# Patient Record
Sex: FEMALE | Race: WHITE | URBAN - METROPOLITAN AREA
[De-identification: names, ages, dates, MRNs, and addresses within clinical notes are randomized per-mention and may not be internally consistent; named-entity substitution may affect disease eponyms.]

---

## 2017-12-13 ENCOUNTER — HOSPITAL ENCOUNTER (EMERGENCY)
Facility: CLINIC | Age: 52
Discharge: HOME | End: 2017-12-14
Attending: NURSE PRACTITIONER | Admitting: NURSE PRACTITIONER
Payer: COMMERCIAL

## 2017-12-13 ENCOUNTER — APPOINTMENT (OUTPATIENT)
Dept: CT IMAGING | Facility: CLINIC | Age: 52
End: 2017-12-13
Attending: NURSE PRACTITIONER
Payer: COMMERCIAL

## 2017-12-13 DIAGNOSIS — R07.89 ATYPICAL CHEST PAIN: ICD-10-CM

## 2017-12-13 DIAGNOSIS — I10 ESSENTIAL HYPERTENSION: ICD-10-CM

## 2017-12-13 DIAGNOSIS — R06.02 SHORTNESS OF BREATH: ICD-10-CM

## 2017-12-13 LAB
ALBUMIN SERPL-MCNC: 3.8 G/DL (ref 3.4–5)
ALP SERPL-CCNC: 50 U/L (ref 40–150)
ALT SERPL W P-5'-P-CCNC: 49 U/L (ref 0–50)
ANION GAP SERPL CALCULATED.3IONS-SCNC: 8 MMOL/L (ref 3–14)
AST SERPL W P-5'-P-CCNC: 16 U/L (ref 0–45)
BASOPHILS # BLD AUTO: 0 10E9/L (ref 0–0.2)
BASOPHILS NFR BLD AUTO: 0.3 %
BILIRUB SERPL-MCNC: 0.3 MG/DL (ref 0.2–1.3)
BUN SERPL-MCNC: 10 MG/DL (ref 7–30)
CALCIUM SERPL-MCNC: 9 MG/DL (ref 8.5–10.1)
CHLORIDE SERPL-SCNC: 109 MMOL/L (ref 94–109)
CO2 SERPL-SCNC: 25 MMOL/L (ref 20–32)
CREAT SERPL-MCNC: 0.61 MG/DL (ref 0.52–1.04)
D DIMER PPP FEU-MCNC: 0.8 UG/ML FEU (ref 0–0.5)
DIFFERENTIAL METHOD BLD: NORMAL
EOSINOPHIL # BLD AUTO: 0.2 10E9/L (ref 0–0.7)
EOSINOPHIL NFR BLD AUTO: 2.8 %
ERYTHROCYTE [DISTWIDTH] IN BLOOD BY AUTOMATED COUNT: 14 % (ref 10–15)
GFR SERPL CREATININE-BSD FRML MDRD: >90 ML/MIN/1.7M2
GLUCOSE SERPL-MCNC: 169 MG/DL (ref 70–99)
HCT VFR BLD AUTO: 39.6 % (ref 35–47)
HGB BLD-MCNC: 13.3 G/DL (ref 11.7–15.7)
IMM GRANULOCYTES # BLD: 0 10E9/L (ref 0–0.4)
IMM GRANULOCYTES NFR BLD: 0.1 %
LIPASE SERPL-CCNC: 400 U/L (ref 73–393)
LYMPHOCYTES # BLD AUTO: 3.2 10E9/L (ref 0.8–5.3)
LYMPHOCYTES NFR BLD AUTO: 47.6 %
MCH RBC QN AUTO: 28.6 PG (ref 26.5–33)
MCHC RBC AUTO-ENTMCNC: 33.6 G/DL (ref 31.5–36.5)
MCV RBC AUTO: 85 FL (ref 78–100)
MONOCYTES # BLD AUTO: 0.6 10E9/L (ref 0–1.3)
MONOCYTES NFR BLD AUTO: 9.1 %
NEUTROPHILS # BLD AUTO: 2.7 10E9/L (ref 1.6–8.3)
NEUTROPHILS NFR BLD AUTO: 40.1 %
PLATELET # BLD AUTO: 183 10E9/L (ref 150–450)
POTASSIUM SERPL-SCNC: 3.8 MMOL/L (ref 3.4–5.3)
PROT SERPL-MCNC: 7.5 G/DL (ref 6.8–8.8)
RBC # BLD AUTO: 4.65 10E12/L (ref 3.8–5.2)
SODIUM SERPL-SCNC: 142 MMOL/L (ref 133–144)
TROPONIN I SERPL-MCNC: <0.015 UG/L (ref 0–0.04)
WBC # BLD AUTO: 6.7 10E9/L (ref 4–11)

## 2017-12-13 PROCEDURE — 85025 COMPLETE CBC W/AUTO DIFF WBC: CPT | Performed by: NURSE PRACTITIONER

## 2017-12-13 PROCEDURE — 25000132 ZZH RX MED GY IP 250 OP 250 PS 637: Performed by: NURSE PRACTITIONER

## 2017-12-13 PROCEDURE — 25000125 ZZHC RX 250: Performed by: NURSE PRACTITIONER

## 2017-12-13 PROCEDURE — 93010 ELECTROCARDIOGRAM REPORT: CPT | Mod: Z6 | Performed by: FAMILY MEDICINE

## 2017-12-13 PROCEDURE — 96374 THER/PROPH/DIAG INJ IV PUSH: CPT | Performed by: FAMILY MEDICINE

## 2017-12-13 PROCEDURE — 84484 ASSAY OF TROPONIN QUANT: CPT | Performed by: NURSE PRACTITIONER

## 2017-12-13 PROCEDURE — 83690 ASSAY OF LIPASE: CPT | Performed by: NURSE PRACTITIONER

## 2017-12-13 PROCEDURE — 85379 FIBRIN DEGRADATION QUANT: CPT | Performed by: NURSE PRACTITIONER

## 2017-12-13 PROCEDURE — 99284 EMERGENCY DEPT VISIT MOD MDM: CPT | Mod: 25 | Performed by: FAMILY MEDICINE

## 2017-12-13 PROCEDURE — 99285 EMERGENCY DEPT VISIT HI MDM: CPT | Mod: 25 | Performed by: FAMILY MEDICINE

## 2017-12-13 PROCEDURE — 96375 TX/PRO/DX INJ NEW DRUG ADDON: CPT | Mod: 59 | Performed by: FAMILY MEDICINE

## 2017-12-13 PROCEDURE — 71260 CT THORAX DX C+: CPT

## 2017-12-13 PROCEDURE — 25000128 H RX IP 250 OP 636: Performed by: NURSE PRACTITIONER

## 2017-12-13 PROCEDURE — 80053 COMPREHEN METABOLIC PANEL: CPT | Performed by: NURSE PRACTITIONER

## 2017-12-13 PROCEDURE — 93005 ELECTROCARDIOGRAM TRACING: CPT | Performed by: FAMILY MEDICINE

## 2017-12-13 RX ORDER — ALUMINA, MAGNESIA, AND SIMETHICONE 2400; 2400; 240 MG/30ML; MG/30ML; MG/30ML
15 SUSPENSION ORAL ONCE
Status: COMPLETED | OUTPATIENT
Start: 2017-12-13 | End: 2017-12-13

## 2017-12-13 RX ORDER — NITROGLYCERIN 0.4 MG/1
0.4 TABLET SUBLINGUAL EVERY 5 MIN PRN
Status: DISCONTINUED | OUTPATIENT
Start: 2017-12-13 | End: 2017-12-14 | Stop reason: HOSPADM

## 2017-12-13 RX ORDER — MORPHINE SULFATE 2 MG/ML
2-4 INJECTION, SOLUTION INTRAMUSCULAR; INTRAVENOUS EVERY 10 MIN PRN
Status: DISCONTINUED | OUTPATIENT
Start: 2017-12-13 | End: 2017-12-14 | Stop reason: HOSPADM

## 2017-12-13 RX ORDER — ASPIRIN 81 MG/1
324 TABLET, CHEWABLE ORAL ONCE
Status: COMPLETED | OUTPATIENT
Start: 2017-12-13 | End: 2017-12-13

## 2017-12-13 RX ORDER — ONDANSETRON 2 MG/ML
4 INJECTION INTRAMUSCULAR; INTRAVENOUS EVERY 30 MIN PRN
Status: DISCONTINUED | OUTPATIENT
Start: 2017-12-13 | End: 2017-12-14 | Stop reason: HOSPADM

## 2017-12-13 RX ORDER — IOPAMIDOL 755 MG/ML
100 INJECTION, SOLUTION INTRAVASCULAR ONCE
Status: COMPLETED | OUTPATIENT
Start: 2017-12-13 | End: 2017-12-13

## 2017-12-13 RX ADMIN — ASPIRIN 81 MG 324 MG: 81 TABLET ORAL at 22:26

## 2017-12-13 RX ADMIN — LIDOCAINE HYDROCHLORIDE 15 ML: 20 SOLUTION ORAL; TOPICAL at 22:53

## 2017-12-13 RX ADMIN — MORPHINE SULFATE 2 MG: 2 INJECTION, SOLUTION INTRAMUSCULAR; INTRAVENOUS at 22:53

## 2017-12-13 RX ADMIN — IOPAMIDOL 100 ML: 755 INJECTION, SOLUTION INTRAVENOUS at 23:11

## 2017-12-13 RX ADMIN — NITROGLYCERIN 0.4 MG: 0.4 TABLET SUBLINGUAL at 22:45

## 2017-12-13 RX ADMIN — NITROGLYCERIN 0.4 MG: 0.4 TABLET SUBLINGUAL at 22:27

## 2017-12-13 RX ADMIN — ALUMINUM HYDROXIDE, MAGNESIUM HYDROXIDE, AND DIMETHICONE 30 ML: 400; 400; 40 SUSPENSION ORAL at 22:52

## 2017-12-13 RX ADMIN — NITROGLYCERIN 0.4 MG: 0.4 TABLET SUBLINGUAL at 22:35

## 2017-12-13 RX ADMIN — ONDANSETRON 4 MG: 2 INJECTION INTRAMUSCULAR; INTRAVENOUS at 22:56

## 2017-12-13 RX ADMIN — SODIUM CHLORIDE 80 ML: 9 INJECTION, SOLUTION INTRAVENOUS at 23:11

## 2017-12-13 ASSESSMENT — ENCOUNTER SYMPTOMS
PALPITATIONS: 0
SHORTNESS OF BREATH: 1
BACK PAIN: 1
ACTIVITY CHANGE: 1

## 2017-12-13 NOTE — ED AVS SNAPSHOT
[facility] Emergency Department    [address] [place] [address]    Phone:  [phone]    Fax:  [phone]                                       Minnie Chisholm   MRN: [id number]    Department:  [facility] Emergency Department   Date of Visit:  12/13/2017           After Visit Summary Signature Page     I have received my discharge instructions, and my questions have been answered. I have discussed any challenges I see with this plan with the nurse or doctor.    ..........................................................................................................................................  Patient/Patient Representative Signature      ..........................................................................................................................................  Patient Representative Print Name and Relationship to Patient    ..................................................               ................................................  Date                                            Time    ..........................................................................................................................................  Reviewed by Signature/Title    ...................................................              ..............................................  Date                                                            Time

## 2017-12-13 NOTE — ED AVS SNAPSHOT
[facility] Emergency Department    [address] [place] [address]    Phone:  [phone]    Fax:  [phone]                                       Minnie Chisholm   MRN: [id number]    Department:  [facility] Emergency Department   Date of Visit:  12/13/2017           Patient Information     Date Of Birth          1965        Your diagnoses for this visit were:     Atypical chest pain     Shortness of breath     Essential hypertension (uncontrolled)        You were seen by Trisha Nguyen APRN CNP and Ricco Richardson MD.      Follow-up Information     Follow up with Deandre Camejo MD In 5 days.    Specialty:  Family Practice    Contact information:    [address] [facility] [address]  [phone]          Follow up with [facility] Emergency Department.    Specialty:  EMERGENCY MEDICINE    Why:  If symptoms worsen    Contact information:    [address] [place] [id number] [phone]    Additional information:    From [address]: Exit at [address] on south side of [place]. Turn right on [address]. Turn left at stoplight on [address]. [facility] will be in view two blocks ahead        Discharge Instructions       Thank you for giving us the opportunity to see you.  There was no evidence of a heart attack tonight based on the blood tests that we ran today.    Your CT scan was reassuring in that it did not show pulmonary embolism or arctic aneurysm, but did show evidence of emphysema from your previous smoking.    Your blood pressure was too high and not well controlled.    Please take your blood pressure medications consistently, and follow-up with Dr. Camejo within the next 5 days.  You need to have your blood pressure better controlled.    Discuss scheduling an outpatient cardiac stress test.    After discharge, please closely monitor for any new or worsening symptoms. Return to the Emergency Department at any time if  your symptoms worsen.        24 Hour Appointment Hotline       To make an appointment at any [facility], call [phone] ([phone]). If you don't have a family doctor or clinic, we will help you find one. [facility] are conveniently located to serve the needs of you and your family.             Review of your medicines      Our records show that you are taking the medicines listed below. If these are incorrect, please call your family doctor or clinic.        Dose / Directions Last dose taken    ASPIRIN PO   Dose:  81 mg        Take 81 mg by mouth   Refills:  0        lisinopril 20 MG tablet   Commonly known as:  PRINIVIL/ZESTRIL   Dose:  20 mg   Quantity:  90 tablet        Take 1 tablet (20 mg) by mouth daily   Refills:  3        metoprolol 50 MG tablet   Commonly known as:  LOPRESSOR   Dose:  50 mg   Quantity:  180 tablet        Take 1 tablet (50 mg) by mouth 2 times daily   Refills:  3        nicotine polacrilex 4 MG gum   Commonly known as:  NICORETTE        1 PIECE OF GUM UP TO 10 TIMES PER DAY AS NEEDED   Refills:  0        triamcinolone 0.1 % cream   Commonly known as:  KENALOG   Quantity:  30 g        Apply sparingly to ear canal two times daily.   Refills:  0                Procedures and tests performed during your visit     Procedure/Test Number of Times Performed    CBC with platelets differential 1    CT Chest Pulmonary Embolism w Contrast 1    Cardiac Continuous Monitoring 1    Comprehensive metabolic panel 1    D dimer quantitative 1    EKG 12-lead, tracing only 2    Lipase 1    Peripheral IV: Standard 1    Pulse oximetry nursing 1    Troponin I 1    Troponin I (second draw) 1      Orders Needing Specimen Collection     None      Pending Results     No orders found for last 3 day(s).            Pending Culture Results     No orders found for last 3 day(s).            Pending Results Instructions     If you had any lab results that were not finalized at the time of your Discharge,  you can call the ED Lab Result RN at [phone]. You will be contacted by this team for any positive Lab results or changes in treatment. The nurses are available 7 days a week from 10A to 6:30P.  You can leave a message 24 hours per day and they will return your call.        Thank you for choosing [facility]       Thank you for choosing [facility] for your care. Our goal is always to provide you with excellent care. Hearing back from our patients is one way we can continue to improve our services. Please take a few minutes to complete the written survey that you may receive in the mail after you visit with us. Thank you!        [employer] Information     [employer] gives you secure access to your electronic health record. If you see a primary care provider, you can also send messages to your care team and make appointments. If you have questions, please call your primary care clinic.  If you do not have a primary care provider, please call [phone] and they will assist you.        Care EveryWhere ID     This is your Care EveryWhere ID. This could be used by other organizations to access your [facility] medical records  [id number]        Equal Access to Services     JC HAYES : Hadii mckenna Geronimo, juliet paulson, shivam ding, graciela biggs. So [facility] [phone].    ATENCIÓN: Si habla español, tiene a vazquez disposición servicios gratuitos de asistencia lingüística. Llame al [phone].    We comply with applicable federal civil rights laws and [place] laws. We do not discriminate on the basis of race, color, national origin, age, disability, sex, sexual orientation, or gender identity.            After Visit Summary       This is your record. Keep this with you and show to your community pharmacist(s) and doctor(s) at your next visit.

## 2017-12-14 VITALS
WEIGHT: 245 LBS | TEMPERATURE: 99.5 F | HEART RATE: 72 BPM | OXYGEN SATURATION: 99 % | BODY MASS INDEX: 35.15 KG/M2 | RESPIRATION RATE: 13 BRPM | DIASTOLIC BLOOD PRESSURE: 76 MMHG | SYSTOLIC BLOOD PRESSURE: 109 MMHG

## 2017-12-14 LAB — TROPONIN I SERPL-MCNC: 0.02 UG/L (ref 0–0.04)

## 2017-12-14 PROCEDURE — 96376 TX/PRO/DX INJ SAME DRUG ADON: CPT | Performed by: FAMILY MEDICINE

## 2017-12-14 PROCEDURE — 25000128 H RX IP 250 OP 636: Performed by: NURSE PRACTITIONER

## 2017-12-14 PROCEDURE — 84484 ASSAY OF TROPONIN QUANT: CPT | Performed by: FAMILY MEDICINE

## 2017-12-14 PROCEDURE — 36415 COLL VENOUS BLD VENIPUNCTURE: CPT | Performed by: FAMILY MEDICINE

## 2017-12-14 RX ADMIN — MORPHINE SULFATE 2 MG: 2 INJECTION, SOLUTION INTRAMUSCULAR; INTRAVENOUS at 00:21

## 2017-12-14 NOTE — ED PROVIDER NOTES
History     Chief Complaint   Patient presents with     Chest Pain     HPI  Minnie Chisholm is a 52 year old female who presents to the emergency department today with complaints of left-sided chest pain.  Patient reports associated indigestion/heartburn and shortness of breath.  Patient reports that the pain started in her left side/back region at 11:00 this morning, chest pain started this evening around 2100 and became worse tonight when she was walking up the stairs.  Patient denies any injury.  Patient denies any history of cardiac disease.  Patient does have a history of high blood pressure and takes lisinopril and metoprolol for this.  She denies any history of high cholesterol.  Patient does report that her dad does have a cardiac history, she is not a smoker.  She is not diabetic.  Patient denies any known history of blood clots.  Patient denies any current headache, she arrives here quite hypertensive with a pressure of 210/126.    Problem List:    Patient Active Problem List    Diagnosis Date Noted     Hypertension goal BP (blood pressure) < 140/90 02/15/2011     Priority: Medium     Anxiety state      Priority: Medium     Problem list name updated by automated process. Provider to review       HYPERLIPIDEMIA LDL GOAL <160 10/31/2010     Priority: Medium     Disturbance of skin sensation      Priority: Medium     Esophageal reflux      Priority: Medium     Mammary duct ectasia      Priority: Medium        Past Medical History:    Past Medical History:   Diagnosis Date     Chest pain, unspecified 06/30/2005     Disturbance of skin sensation      Esophageal reflux      Female stress incontinence      Hyperlipidemia LDL goal <160 10/31/2010     Hypertension goal BP (blood pressure) < 140/90 2/15/2011     Irregular menstrual cycle      Mammary duct ectasia      Tobacco use disorder        Past Surgical History:    Past Surgical History:   Procedure Laterality Date     ANTROSTOMY NASAL  5/22/2012     Procedure:ANTROSTOMY NASAL; Endoscopic Right maxillary sinus surgery exploration for right antrostomy and right submucousal resection of the turbinates; Surgeon:ALEXA JHAVERI; Location:[facility] OR     BREAST LUMPECTOMY, RT/LT  1/30/08    right breast lump removed     C LIGATE FALLOPIAN TUBE,POSTPARTUM      Tubal Ligation     C TOTAL ABD HYSTERECTOMY+BLAD REPR  6/16/2008     C TREAT ECTOPIC PREG,NON REMVAL      Tube & Ectopic Preg., Removal     CHOLECYSTECTOMY, LAPOROSCOPIC  10/20/06    Cholecystectomy, Laparoscopic     COLONOSCOPY  12/8/2010    COMBINED COLONOSCOPY, SINGLE BIOPSY/POLYPECTOMY BY BIOPSY performed by CATIE VICENTE at  GI     ENDOSCOPIC SINUS SURGERY  5/22/2012    Procedure:ENDOSCOPIC SINUS SURGERY; Surgeon:ALEXA JHAVERI; Location: OR     ENDOSCOPY  07/17/2006    Upper GI     HC EXCISION BREAST LESION W XRAY MARKER, OPEN SINGLE  01/29/08    right     HC PLACE NEEDLE WIRE PREOP, BREAST, INITIAL  01/29/08     HEMILAMINECTOMY, DISCECTOMY LUMBAR ONE LEVEL, COMBINED Left 4/1/2015    Procedure: COMBINED HEMILAMINECTOMY, DISCECTOMY LUMBAR ONE LEVEL;  Surgeon: Emilio Patten MD;  Location: [facility] OR     HYSTERECTOMY, PAP NO LONGER INDICATED       SURGICAL HISTORY OF -       mammary gland removal, left     TURBINOPLASTY  5/22/2012    Procedure:TURBINOPLASTY; Surgeon:ALEXA JHAVERI; Location: OR       Family History:    Family History   Problem Relation Age of Onset     CEREBROVASCULAR DISEASE Mother      [email or web address].     DIABETES Maternal Grandfather      HEART DISEASE Maternal Grandfather      stephanie. Heart Failure     Alzheimer Disease Maternal Grandmother      Hypertension Brother      Lipids Brother      Hypertension Brother      Lipids Brother      Congenital Anomalies Brother      Cancer - colorectal Maternal Uncle      possible colitis     Neurologic Disorder Father      ALS     Anesthesia Reaction Mother      Respiratory arrest with anesthesia       Social History:  Marital Status:    [5]  Social History   Substance Use Topics     Smoking status: Former Smoker     Packs/day: 0.00     Years: 15.00     Types: Cigarettes     Quit date: 9/5/2011     Smokeless tobacco: Never Used      Comment: Is now just chewing nicotine gum, 10 pc. per day     Alcohol use 3.0 oz/week      Comment: rare        Medications:      ASPIRIN PO   metoprolol (LOPRESSOR) 50 MG tablet   lisinopril (PRINIVIL,ZESTRIL) 20 MG tablet   triamcinolone (KENALOG) 0.1 % cream   NICOTINE POLACRILEX 4 MG MT GUM         Review of Systems   Constitutional: Positive for activity change.   Respiratory: Positive for shortness of breath.    Cardiovascular: Positive for chest pain. Negative for palpitations and leg swelling.   Musculoskeletal: Positive for back pain.   All other systems reviewed and are negative.      Physical Exam   BP: (!) 210/126  Pulse: 87  Heart Rate: 80  Temp: 99.5  F (37.5  C)  Resp: 18  Weight: 111.1 kg (245 lb)  SpO2: 97 %      Physical Exam   Constitutional: She is oriented to person, place, and time. She appears well-developed and well-nourished. No distress.   HENT:   Mouth/Throat: Oropharynx is clear and moist.   Eyes: Conjunctivae are normal.   Neck: Normal range of motion. Neck supple.   Cardiovascular: Normal rate, regular rhythm, normal heart sounds and intact distal pulses.    No murmur heard.  Pulmonary/Chest: Effort normal and breath sounds normal. She exhibits no tenderness.   Abdominal: Soft. Bowel sounds are normal. She exhibits no distension. There is no tenderness.   Musculoskeletal: Normal range of motion.   Neurological: She is alert and oriented to person, place, and time.   Skin: Skin is warm and dry. She is not diaphoretic.   Psychiatric: She has a normal mood and affect.       ED Course     ED Course     Procedures         EKG Interpretation:      Interpreted by Trisha Nguyen  Time reviewed: 2213  Symptoms at time of EKG: Chest pain, left sided pain  Rhythm: normal sinus   Rate:  normal  Axis: normal  Ectopy: none  Conduction: normal  ST Segments/ T Waves: No ST-T wave changes  Q Waves: none  Comparison to prior: Unchanged  Clinical Impression: normal EKG      Results for orders placed or performed during the hospital encounter of 12/13/17   CT Chest Pulmonary Embolism w Contrast    Narrative    CT CHEST PULMONARY EMBOLISM W CONTRAST  12/13/2017 11:21 PM     HISTORY: Elevated D-dimer, chest pain.     TECHNIQUE: Volumetric acquisition of the chest after the  administration of 90 mL Isovue-370 IV contrast. Radiation dose for  this scan was reduced using automated exposure control, adjustment of  the mA and/or kV according to patient size, or iterative  reconstruction technique.     COMPARISON: None.    FINDINGS: No visualized pulmonary embolism. Normal heart size. No  thoracic aortic aneurysm. No acute infiltrates, pleural effusions, or  pneumothorax. Areas of minimal linear atelectasis or scarring in both  lungs. Emphysema. Cholecystectomy. No destructive bone lesions.      Impression    IMPRESSION:  1. No visualized pulmonary embolism or other acute findings.  2. Emphysema.   CBC with platelets differential   Result Value Ref Range    WBC 6.7 4.0 - 11.0 10e9/L    RBC Count 4.65 3.8 - 5.2 10e12/L    Hemoglobin 13.3 11.7 - 15.7 g/dL    Hematocrit 39.6 35.0 - 47.0 %    MCV 85 78 - 100 fl    MCH 28.6 26.5 - 33.0 pg    MCHC 33.6 31.5 - 36.5 g/dL    RDW 14.0 10.0 - 15.0 %    Platelet Count 183 150 - 450 10e9/L    Diff Method Automated Method     % Neutrophils 40.1 %    % Lymphocytes 47.6 %    % Monocytes 9.1 %    % Eosinophils 2.8 %    % Basophils 0.3 %    % Immature Granulocytes 0.1 %    Absolute Neutrophil 2.7 1.6 - 8.3 10e9/L    Absolute Lymphocytes 3.2 0.8 - 5.3 10e9/L    Absolute Monocytes 0.6 0.0 - 1.3 10e9/L    Absolute Eosinophils 0.2 0.0 - 0.7 10e9/L    Absolute Basophils 0.0 0.0 - 0.2 10e9/L    Abs Immature Granulocytes 0.0 0 - 0.4 10e9/L   Troponin I   Result Value Ref Range    Troponin  I ES <0.015 0.000 - 0.045 ug/L   Comprehensive metabolic panel   Result Value Ref Range    Sodium 142 133 - 144 mmol/L    Potassium 3.8 3.4 - 5.3 mmol/L    Chloride 109 94 - 109 mmol/L    Carbon Dioxide 25 20 - 32 mmol/L    Anion Gap 8 3 - 14 mmol/L    Glucose 169 (H) 70 - 99 mg/dL    Urea Nitrogen 10 7 - 30 mg/dL    Creatinine 0.61 0.52 - 1.04 mg/dL    GFR Estimate >90 >60 mL/min/1.7m2    GFR Estimate If Black >90 >60 mL/min/1.7m2    Calcium 9.0 8.5 - 10.1 mg/dL    Bilirubin Total 0.3 0.2 - 1.3 mg/dL    Albumin 3.8 3.4 - 5.0 g/dL    Protein Total 7.5 6.8 - 8.8 g/dL    Alkaline Phosphatase 50 40 - 150 U/L    ALT 49 0 - 50 U/L    AST 16 0 - 45 U/L   D dimer quantitative   Result Value Ref Range    D Dimer 0.8 (H) 0.0 - 0.50 ug/ml FEU   Lipase   Result Value Ref Range    Lipase 400 (H) 73 - 393 U/L       Assessments & Plan (with Medical Decision Making)  Minnie is a 52-year-old female with a history of hypertension and acid reflux who presents to the emergency department today with chest pain, left-sided pain and shortness of breath.  Please refer to HPI and focused exam.  Patient also endorses nausea on arrival.  Patient is quite hypertensive on arrival here with an initial pressure of 210/126, concerns for an acute cardiac event given patient's history and symptoms.  EKG was obtained on arrival and is negative for any acute ischemic findings.  Patient was given nitroglycerin which did resolve her chest pain, it did not improve her left-sided pain.  Patient was given a full dose of aspirin as well.  Patient was given a GI cocktail for acid reflux as well as morphine for her left-sided pain and is currently at 2345 pain-free.  Blood work was obtained including a CBC, troponin, CMP, d-dimer, lipase.  CBC is unremarkable.  CMP returned with a mildly elevated glucose of 169 is otherwise within normal limits.  D-dimer is elevated at 0.8, lipase is mildly elevated at 400.  Initial troponin is undetectable.  CT PE was  obtained secondary to elevated d-dimer and is fortunately negative for PE.  Patient has a current heart score of 3, given her relief of chest pain with nitroglycerin I am going to obtain a 4 hour troponin.  If this remains undetectable I feel patient is stable to be discharged home.  This will be followed up by Dr. Richardson.  Patient was updated on plan of care and is agreeable.  Remains in stable condition, her blood pressure has improved and is currently 159/93.     I have reviewed the nursing notes.    I have reviewed the findings, diagnosis, plan and need for follow up with the patient.    New Prescriptions    No medications on file       Final diagnoses:   Atypical chest pain   Shortness of breath       12/13/2017   [facility] EMERGENCY DEPARTMENT     Trisha Nguyen, MIK CNP  12/13/17 5613

## 2017-12-14 NOTE — ED NOTES
Pt c/o chest pain, SOB and indigestion since 1100.  Worse tonight with walking up the stairs and activity.

## 2017-12-14 NOTE — DISCHARGE INSTRUCTIONS
Thank you for giving us the opportunity to see you.  There was no evidence of a heart attack tonight based on the blood tests that we ran today.    Your CT scan was reassuring in that it did not show pulmonary embolism or arctic aneurysm, but did show evidence of emphysema from your previous smoking.    Your blood pressure was too high and not well controlled.    Please take your blood pressure medications consistently, and follow-up with Dr. Camejo within the next 5 days.  You need to have your blood pressure better controlled.    Discuss scheduling an outpatient cardiac stress test.    After discharge, please closely monitor for any new or worsening symptoms. Return to the Emergency Department at any time if your symptoms worsen.

## 2017-12-14 NOTE — ED NOTES
DMeyer NP updated on pain and BP after 1st nitro. To give up to two more. Then go to GI cocktail and morphine

## 2017-12-14 NOTE — ED NOTES
MERLENE Nguyen NP wants pt to get asa, then one nitro. If no relief can give GI cocktail and morphine

## 2017-12-14 NOTE — ED NOTES
"Pt states minimal pain to left mid back. Pt states \"much better than earlier.\" Pt's /76. Pt denies chest pain or shortness of breath. Pt feels comfortable with plan.   "

## 2017-12-15 DIAGNOSIS — I10 ESSENTIAL HYPERTENSION WITH GOAL BLOOD PRESSURE LESS THAN 140/90: ICD-10-CM

## 2017-12-15 RX ORDER — METOPROLOL TARTRATE 50 MG
TABLET ORAL
Qty: 60 TABLET | Refills: 0 | Status: SHIPPED | OUTPATIENT
Start: 2017-12-15 | End: 2017-12-18

## 2017-12-15 NOTE — TELEPHONE ENCOUNTER
Routing refill request to provider for review/approval because:  A break in medication, RX  >5 months ago  Patient has not been seen in >1 year    Jessie Ruelas, RN  [facility]

## 2017-12-15 NOTE — TELEPHONE ENCOUNTER
Requested Prescriptions   Pending Prescriptions Disp Refills     metoprolol (LOPRESSOR) 50 MG tablet [Pharmacy Med Name: METOPROLOL TARTRATE 50MG TABS] 180 tablet 3     Sig: TAKE ONE TABLET BY MOUTH TWICE A DAY    Beta-Blockers Protocol Passed    12/15/2017 10:05 AM       Passed - Blood pressure under 140/90    BP Readings from Last 3 Encounters:   12/14/17 109/76   05/16/16 136/84   03/28/16 (!) 195/115                Passed - Patient is age 6 or older       Passed - Recent or future visit with authorizing provider's specialty    Patient had office visit in the last year or has a visit in the next 30 days with authorizing provider.  See chart review.

## 2017-12-18 ENCOUNTER — OFFICE VISIT (OUTPATIENT)
Dept: FAMILY MEDICINE | Facility: OTHER | Age: 52
End: 2017-12-18
Payer: COMMERCIAL

## 2017-12-18 VITALS
BODY MASS INDEX: 35.3 KG/M2 | HEART RATE: 96 BPM | SYSTOLIC BLOOD PRESSURE: 132 MMHG | TEMPERATURE: 98.2 F | WEIGHT: 246.6 LBS | OXYGEN SATURATION: 97 % | DIASTOLIC BLOOD PRESSURE: 84 MMHG | HEIGHT: 70 IN | RESPIRATION RATE: 16 BRPM

## 2017-12-18 DIAGNOSIS — Z11.59 ENCOUNTER FOR HEPATITIS C SCREENING TEST FOR LOW RISK PATIENT: ICD-10-CM

## 2017-12-18 DIAGNOSIS — K21.9 GASTROESOPHAGEAL REFLUX DISEASE WITHOUT ESOPHAGITIS: ICD-10-CM

## 2017-12-18 DIAGNOSIS — E66.01 MORBID OBESITY (H): ICD-10-CM

## 2017-12-18 DIAGNOSIS — L21.9 SEBORRHEIC DERMATITIS: ICD-10-CM

## 2017-12-18 DIAGNOSIS — I10 ESSENTIAL HYPERTENSION WITH GOAL BLOOD PRESSURE LESS THAN 140/90: ICD-10-CM

## 2017-12-18 DIAGNOSIS — R07.89 ATYPICAL CHEST PAIN: ICD-10-CM

## 2017-12-18 DIAGNOSIS — E78.5 HYPERLIPIDEMIA LDL GOAL <160: ICD-10-CM

## 2017-12-18 DIAGNOSIS — Z12.31 ENCOUNTER FOR SCREENING MAMMOGRAM FOR BREAST CANCER: Primary | ICD-10-CM

## 2017-12-18 LAB
ANION GAP SERPL CALCULATED.3IONS-SCNC: 8 MMOL/L (ref 3–14)
BUN SERPL-MCNC: 9 MG/DL (ref 7–30)
CALCIUM SERPL-MCNC: 9 MG/DL (ref 8.5–10.1)
CHLORIDE SERPL-SCNC: 107 MMOL/L (ref 94–109)
CHOLEST SERPL-MCNC: 218 MG/DL
CO2 SERPL-SCNC: 27 MMOL/L (ref 20–32)
CREAT SERPL-MCNC: 0.77 MG/DL (ref 0.52–1.04)
GFR SERPL CREATININE-BSD FRML MDRD: 78 ML/MIN/1.7M2
GLUCOSE SERPL-MCNC: 125 MG/DL (ref 70–99)
HCV AB SERPL QL IA: NONREACTIVE
HDLC SERPL-MCNC: 40 MG/DL
LDLC SERPL CALC-MCNC: 146 MG/DL
NONHDLC SERPL-MCNC: 178 MG/DL
POTASSIUM SERPL-SCNC: 4 MMOL/L (ref 3.4–5.3)
SODIUM SERPL-SCNC: 142 MMOL/L (ref 133–144)
TRIGL SERPL-MCNC: 161 MG/DL

## 2017-12-18 PROCEDURE — 80061 LIPID PANEL: CPT | Performed by: FAMILY MEDICINE

## 2017-12-18 PROCEDURE — 36415 COLL VENOUS BLD VENIPUNCTURE: CPT | Performed by: FAMILY MEDICINE

## 2017-12-18 PROCEDURE — 99214 OFFICE O/P EST MOD 30 MIN: CPT | Performed by: FAMILY MEDICINE

## 2017-12-18 PROCEDURE — 80048 BASIC METABOLIC PNL TOTAL CA: CPT | Performed by: FAMILY MEDICINE

## 2017-12-18 PROCEDURE — 86803 HEPATITIS C AB TEST: CPT | Performed by: FAMILY MEDICINE

## 2017-12-18 RX ORDER — METOPROLOL TARTRATE 50 MG
50 TABLET ORAL 2 TIMES DAILY
Qty: 180 TABLET | Refills: 3 | Status: SHIPPED | OUTPATIENT
Start: 2017-12-18 | End: 2018-12-20

## 2017-12-18 RX ORDER — LISINOPRIL 20 MG/1
20 TABLET ORAL DAILY
Qty: 90 TABLET | Refills: 3 | Status: SHIPPED | OUTPATIENT
Start: 2017-12-18 | End: 2018-12-20

## 2017-12-18 RX ORDER — TRIAMCINOLONE ACETONIDE 1 MG/G
CREAM TOPICAL
Qty: 30 G | Refills: 0 | Status: SHIPPED | OUTPATIENT
Start: 2017-12-18 | End: 2018-03-30

## 2017-12-18 ASSESSMENT — PAIN SCALES - GENERAL: PAINLEVEL: MILD PAIN (2)

## 2017-12-18 NOTE — NURSING NOTE
"Chief Complaint   Patient presents with     RECHECK     Hypertension       Initial /84 (BP Location: Right arm, Patient Position: Chair, Cuff Size: Adult Large)  Pulse 96  Temp 98.2  F (36.8  C) (Temporal)  Resp 16  Ht 5' 10\" (1.778 m)  Wt 246 lb 9.6 oz (111.9 kg)  LMP 01/15/2008  SpO2 97%  BMI 35.38 kg/m2 Estimated body mass index is 35.38 kg/(m^2) as calculated from the following:    Height as of this encounter: 5' 10\" (1.778 m).    Weight as of this encounter: 246 lb 9.6 oz (111.9 kg).  Medication Reconciliation: complete     Lisa Carmona MA 12/18/2017  9:34 AM          "

## 2017-12-18 NOTE — MR AVS SNAPSHOT
After Visit Summary   12/18/2017    Minnie Chisholm    MRN: [id number]           Patient Information     Date Of Birth          1965        Visit Information        Provider Department      12/18/2017 9:20 AM Deandre Camejo MD [facility]        Today's Diagnoses     Encounter for screening mammogram for breast cancer    -  1    Essential hypertension with goal blood pressure less than 140/90        Seborrheic dermatitis        Encounter for hepatitis C screening test for low risk patient        Hyperlipidemia LDL goal <160        Atypical chest pain        Gastroesophageal reflux disease without esophagitis        Morbid obesity (H)           Follow-ups after your visit        Follow-up notes from your care team     Return in about 4 weeks (around 1/15/2018) for BP Recheck recheck breathing.      Your next 10 appointments already scheduled     Dec 19, 2017  2:00 PM CST   (Arrive by 1:45 PM)   MA SCREENING DIGITAL BILATERAL with MCMA1   [facility] ([facility]    [address] [facility] [id number] [phone]           Do not use any powder, lotion or deodorant under your arms or on your breast. If you do, we will ask you to remove it before your exam.  Wear comfortable, two-piece clothing.  If you have any allergies, tell your care team.  Bring any previous mammograms from other facilities or have them mailed to the breast center.            Dec 20, 2017  2:00 PM CST   Ech Stress Test with PHECHR2   [facility] ([facility])    [address] [place] [address]   [phone]           1. Please bring or wear a comfortable two-piece outfit and walking shoes. 2. Stop eating 3 hours before the test. You may drink water or juice. 3. Stop all caffeine 12 hours before the test. This includes coffee, tea, soda pop, chocolate and certain medicines (such as Anacin and Excederin). Also avoid decaf coffee and  tea, as these contain small amounts of caffeine. 4. No alcohol, smoking or use of other tobacco products for 12 hours before the test. 5. Refer to your provider instructions to see if you need to stop any medications (such as beta-blockers or nitrates) for this test. 6. For patients with diabetes: - If you take insulin, call your diabetes care team. Ask if you should take a   dose the morning of your test. - If you take diabetes medicine by mouth, dont take it on the morning of your test. Bring it with you to take after the test. (If you have questions, call your diabetes care team) 7. When you arrive, please tell us if: - You have diabetes. - You have taken Viagra, Cialis or Levitra in the past 48 hours. 8. For any questions that cannot be answered, please contact the ordering physician            Rommel 15, 2018  9:40 AM CST   Office Visit with Deandre Camejo MD   [facility] ([facility]    [facility] [address] [facility] [address] [phone]           Bring a current list of meds and any records pertaining to this visit. For Physicals, please bring immunization records and any forms needing to be filled out. Please arrive 10 minutes early to complete paperwork.              Future tests that were ordered for you today     Open Future Orders        Priority Expected Expires Ordered    Exercise Stress Echocardiogram Routine  12/18/2018 12/18/2017    MA Screening Digital Bilateral Routine  12/18/2018 12/18/2017            Who to contact     If you have questions or need follow up information about today's clinic visit or your schedule please contact [facility] directly at [phone].  Normal or non-critical lab and imaging results will be communicated to you by MyChart, letter or phone within 4 business days after the clinic has received the results. If you do not hear from us within 7 days, please contact the clinic through MyChart or phone. If you have a critical or  "abnormal lab result, we will notify you by phone as soon as possible.  Submit refill requests through [email or web address] or call your pharmacy and they will forward the refill request to us. Please allow 3 business days for your refill to be completed.          Additional Information About Your Visit        [employer] Information     [email or web address] gives you secure access to your electronic health record. If you see a primary care provider, you can also send messages to your care team and make appointments. If you have questions, please call your primary care clinic.  If you do not have a primary care provider, please call [phone] and they will assist you.        Care EveryWhere ID     This is your Care EveryWhere ID. This could be used by other organizations to access your [place] medical records  [id number]        Your Vitals Were     Pulse Temperature Respirations Height Last Period Pulse Oximetry    96 98.2  F (36.8  C) (Temporal) 16 5' 10\" (1.778 m) 01/15/2008 97%    BMI (Body Mass Index)                   35.38 kg/m2            Blood Pressure from Last 3 Encounters:   12/18/17 132/84   12/14/17 109/76   05/16/16 136/84    Weight from Last 3 Encounters:   12/18/17 246 lb 9.6 oz (111.9 kg)   12/13/17 245 lb (111.1 kg)   05/16/16 237 lb 6.4 oz (107.7 kg)              We Performed the Following     Basic metabolic panel     Hepatitis C antibody     Lipid Profile (Chol, Trig, HDL, LDL calc)          Today's Medication Changes          These changes are accurate as of: 12/18/17 10:14 AM.  If you have any questions, ask your nurse or doctor.               Start taking these medicines.        Dose/Directions    omeprazole 20 MG CR capsule   Commonly known as:  priLOSEC   Used for:  Gastroesophageal reflux disease without esophagitis   Started by:  Deandre Camejo MD        Dose:  20 mg   Take 1 capsule (20 mg) by mouth daily   Quantity:  30 capsule   Refills:  1         These medicines have changed or have updated prescriptions.  "       Dose/Directions    metoprolol 50 MG tablet   Commonly known as:  LOPRESSOR   This may have changed:  See the new instructions.   Used for:  Essential hypertension with goal blood pressure less than 140/90   Changed by:  Deandre Camejo MD        Dose:  50 mg   Take 1 tablet (50 mg) by mouth 2 times daily   Quantity:  180 tablet   Refills:  3            Where to get your medicines      These medications were sent to [facility] - [place], [place] - [address]   [address] , [facility] [address]     Phone:  [phone]     lisinopril 20 MG tablet    metoprolol 50 MG tablet    omeprazole 20 MG CR capsule    triamcinolone 0.1 % cream                Primary Care Provider Office Phone # Fax #    Deandre Camejo [phone]       [address] [facility] [address]        Equal Access to Services     JC HAYES : Hadii aad ku hadasho Soanthony, waaxda luqadaha, qaybta kaalmada adeegyajulio, graciela cummings . So [facility] [phone].    ATENCIÓN: Si habla español, tiene a vazquez disposición servicios gratuitos de asistencia lingüística. Llame al [phone].    We comply with applicable federal civil rights laws and [place] laws. We do not discriminate on the basis of race, color, national origin, age, disability, sex, sexual orientation, or gender identity.            Thank you!     Thank you for choosing [facility]  for your care. Our goal is always to provide you with excellent care. Hearing back from our patients is one way we can continue to improve our services. Please take a few minutes to complete the written survey that you may receive in the mail after your visit with us. Thank you!             Your Updated Medication List - Protect others around you: Learn how to safely use, store and throw away your medicines at www.disposemymeds.org.          This list is accurate as of: 12/18/17 10:14 AM.  Always use your most recent med list.                   Brand  Name Dispense Instructions for use Diagnosis    ASPIRIN PO      Take 81 mg by mouth        lisinopril 20 MG tablet    PRINIVIL/ZESTRIL    90 tablet    Take 1 tablet (20 mg) by mouth daily    Essential hypertension with goal blood pressure less than 140/90       metoprolol 50 MG tablet    LOPRESSOR    180 tablet    Take 1 tablet (50 mg) by mouth 2 times daily    Essential hypertension with goal blood pressure less than 140/90       nicotine polacrilex 4 MG gum    NICORETTE     1 PIECE OF GUM UP TO 10 TIMES PER DAY AS NEEDED        omeprazole 20 MG CR capsule    priLOSEC    30 capsule    Take 1 capsule (20 mg) by mouth daily    Gastroesophageal reflux disease without esophagitis       triamcinolone 0.1 % cream    KENALOG    30 g    Apply sparingly to ear canal two times daily.    Seborrheic dermatitis

## 2017-12-18 NOTE — PROGRESS NOTES
SUBJECTIVE:   Minnie Chisholm is a 52 year old female who presents to clinic today for the following health issues:        Hypertension Follow-up      Outpatient blood pressures are being checked at home.  Results are 167 / 80.    Low Salt Diet: low salt        Amount of exercise or physical activity: 4-5 days/week for an average of 30-45 minutes just started back with exercising.     Problems taking medications regularly: No    Medication side effects: none    Diet: regular (no restrictions) and low salt    ED/UC Followup:    Facility:  [facility]  Date of visit: 12/13/2017  Reason for visit: Chest Pain and Shortness of breath  Current Status: Doing better, still having shortness of breath and some chest pain     This is a shared visit with Trisha Nguyen CNP. I personally performed a face to face diagnostic evaluation of this patient. I reviewed and agree with the care plan noted above and in the discharge instructions. Based on my independent history and exam, and review of the available diagnostic testing, my findings are as follows: Minnie Chisohlm is a 52 year old female with left-sided chest pain, indigestion and heartburn and shortness of breath.  Pain became worse this evening, and she presented to the emergency department with a markedly elevated blood pressure of 210/126.  She is currently on lisinopril and metoprolol, and her home blood pressure readings have been irritably high.  She does have hyperlipidemia, and is a former smoker who quit 7 years ago.  The patient's chest pain improved with nitroglycerin, and her blood pressure started to normalize.  Her initial troponin level was less than 0.015.  Her d-dimer was elevated, and given that she was having some left lateral and posterior chest pain, slightly worse with exhalation, she underwent CT scan to rule out pulmonary embolism.  There is no visualized acute findings, but there was evidence for emphysema.  Patient was observed in the emergency  department until she had a 4 hour second troponin drawn.  The level came back at 0.016.  The patient's central chest discomfort and indigestion have resolved, but she is still having some mild to moderate left posterior chest pain rated 5/10.  She did not want anything for pain.  We discussed the importance of better blood pressure control, and perhaps requiring additional adjustments in her blood pressure medications.  Follow-up with Dr. Camejo within the next 5 days.  Discuss scheduling an outpatient cardiac stress test.      Ricco Richardson      Concern: Would like to talk about Kenalog cream and would like to get Mammo done by end of this year.     COPD Possible new diagnosis    Symptoms are currently: dyspnea with exertion.    Current fatigue or dyspnea with ambulation: worsened from baseline    Shortness of breath: stable    Increased or change in Cough/Sputum: No    Fever(s): No    Any ER/UC or hospital admissions since your last visit? Yes - ER/UC     History   Smoking Status     Former Smoker     Packs/day: 0.00     Years: 15.00     Types: Cigarettes     Quit date: 9/5/2011   Smokeless Tobacco     Never Used     Comment: Is now just chewing nicotine gum, 10 pc. per day     No results found for: FEV1, [id number]        Problem list and histories reviewed & adjusted, as indicated.  Additional history: as documented    Patient Active Problem List   Diagnosis     Disturbance of skin sensation     Esophageal reflux     Mammary duct ectasia     HYPERLIPIDEMIA LDL GOAL <160     Hypertension goal BP (blood pressure) < 140/90     Anxiety state     Morbid obesity (H)     Past Surgical History:   Procedure Laterality Date     ANTROSTOMY NASAL  5/22/2012    Procedure:ANTROSTOMY NASAL; Endoscopic Right maxillary sinus surgery exploration for right antrostomy and right submucousal resection of the turbinates; Surgeon:ALEXA JHAVERI; Location:[facility] OR     BREAST LUMPECTOMY, RT/LT  1/30/08    right breast lump removed      C LIGATE FALLOPIAN TUBE,POSTPARTUM      Tubal Ligation     C TOTAL ABD HYSTERECTOMY+BLAD REPR  6/16/2008     C TREAT ECTOPIC PREG,NON REMVAL      Tube & Ectopic Preg., Removal     CHOLECYSTECTOMY, LAPOROSCOPIC  10/20/06    Cholecystectomy, Laparoscopic     COLONOSCOPY  12/8/2010    COMBINED COLONOSCOPY, SINGLE BIOPSY/POLYPECTOMY BY BIOPSY performed by CATIE VICENTE at  GI     ENDOSCOPIC SINUS SURGERY  5/22/2012    Procedure:ENDOSCOPIC SINUS SURGERY; Surgeon:ALEXA JHAVERI; Location: OR     ENDOSCOPY  07/17/2006    Upper GI     HC EXCISION BREAST LESION W XRAY MARKER, OPEN SINGLE  01/29/08    right     HC PLACE NEEDLE WIRE PREOP, BREAST, INITIAL  01/29/08     HEMILAMINECTOMY, DISCECTOMY LUMBAR ONE LEVEL, COMBINED Left 4/1/2015    Procedure: COMBINED HEMILAMINECTOMY, DISCECTOMY LUMBAR ONE LEVEL;  Surgeon: Emilio Patten MD;  Location: [facility] OR     HYSTERECTOMY, PAP NO LONGER INDICATED       SURGICAL HISTORY OF -       mammary gland removal, left     TURBINOPLASTY  5/22/2012    Procedure:TURBINOPLASTY; Surgeon:ALEXA JHAVERI; Location: OR       Social History   Substance Use Topics     Smoking status: Former Smoker     Packs/day: 0.00     Years: 15.00     Types: Cigarettes     Quit date: 9/5/2011     Smokeless tobacco: Never Used      Comment: Is now just chewing nicotine gum, 10 pc. per day     Alcohol use 3.0 oz/week      Comment: rare     Family History   Problem Relation Age of Onset     CEREBROVASCULAR DISEASE Mother      [email or web address].     Anesthesia Reaction Mother      Respiratory arrest with anesthesia     DIABETES Maternal Grandfather      HEART DISEASE Maternal Grandfather      stephanie. Heart Failure     Alzheimer Disease Maternal Grandmother      Hypertension Brother      Lipids Brother      Hypertension Brother      Lipids Brother      Congenital Anomalies Brother      Neurologic Disorder Father      ALS     Cancer - colorectal Maternal Uncle      possible colitis         Current  Outpatient Prescriptions   Medication Sig Dispense Refill     metoprolol (LOPRESSOR) 50 MG tablet TAKE ONE TABLET BY MOUTH TWICE A DAY 60 tablet 0     ASPIRIN PO Take 81 mg by mouth       lisinopril (PRINIVIL,ZESTRIL) 20 MG tablet Take 1 tablet (20 mg) by mouth daily 90 tablet 3     triamcinolone (KENALOG) 0.1 % cream Apply sparingly to ear canal two times daily. 30 g 0     NICOTINE POLACRILEX 4 MG MT GUM 1 PIECE OF GUM UP TO 10 TIMES PER DAY AS NEEDED       Allergies   Allergen Reactions     Sulfa Drugs Itching     Ciprofloxacin Rash     Recent Labs   Lab Test  12/13/17   2225  02/02/15   1522  02/01/15   1715   01/08/15   1049  08/12/13   1343   02/06/12   1548  11/01/11   1632   02/22/11   0833   LDL   --    --    --    --   147*  129   --   171*   --    < >  140*   HDL   --    --    --    --   47*   --    --   40*   --    --   36*   TRIG   --    --    --    --   121   --    --   139   --    --   163*   ALT  49  57*  58*   --    --    --    --    --    --    --    --    CR  0.61  0.83  0.77   < >  0.69   --    < >  0.67  0.75   < >   --    GFRESTIMATED  >90  72  79   < >  89   --    < >  >90  82   < >   --    GFRESTBLACK  >90  88  >90   GFR Calc     < >  >90   GFR Calc     --    < >  >90  >90   < >   --    POTASSIUM  3.8  3.7  4.0   < >  3.9   --    < >  3.9  4.0   < >   --    TSH   --    --    --    --    --    --    --    --   1.31   --    --     < > = values in this interval not displayed.      BP Readings from Last 3 Encounters:   12/18/17 132/84   12/14/17 109/76   05/16/16 136/84    Wt Readings from Last 3 Encounters:   12/18/17 246 lb 9.6 oz (111.9 kg)   12/13/17 245 lb (111.1 kg)   05/16/16 237 lb 6.4 oz (107.7 kg)                  Labs reviewed in EPIC          Reviewed and updated as needed this visit by clinical staffTobacco  Allergies  Meds  Problems  Med Hx  Surg Hx  Fam Hx  Soc Hx        Reviewed and updated as needed this visit by Provider  Allergies  Meds   "Problems         ROS:  C: NEGATIVE for fever, chills, change in weight  E/M: NEGATIVE for ear, mouth and throat problems  RESP:POSITIVE for dyspnea on exertion and former smoker and NEGATIVE for cough-productive, hemoptysis, Hx COPD, sputum and wheezing  CV: POSITIVE for chest pain/chest pressure and dyspnea on exertion and NEGATIVE for claudication, cyanosis, diaphoresis, irregular heart beat, lower extremity edema, orthopnea, paroxysmal nocturnal dyspnea and tachycardia  ROS otherwise negative    OBJECTIVE:     /84 (BP Location: Right arm, Patient Position: Chair, Cuff Size: Adult Large)  Pulse 96  Temp 98.2  F (36.8  C) (Temporal)  Resp 16  Ht 5' 10\" (1.778 m)  Wt 246 lb 9.6 oz (111.9 kg)  LMP 01/15/2008  SpO2 97%  BMI 35.38 kg/m2  Body mass index is 35.38 kg/(m^2).  GENERAL: alert, no distress and obese  EYES: Eyes grossly normal to inspection, PERRL and conjunctivae and sclerae normal  HENT: ear canals and TM's normal, nose and mouth without ulcers or lesions  NECK: no adenopathy, no asymmetry, masses, or scars and thyroid normal to palpation  RESP: lungs clear to auscultation - no rales, rhonchi or wheezes  CV: regular rate and rhythm, normal S1 S2, no S3 or S4, no murmur, click or rub, no peripheral edema and peripheral pulses strong  ABDOMEN: soft, nontender, no hepatosplenomegaly, no masses and bowel sounds normal  MS: no gross musculoskeletal defects noted, no edema    Diagnostic Test Results:  none     ASSESSMENT/PLAN:   8. Morbid obesity (H)  BMI:   Estimated body mass index is 35.38 kg/(m^2) as calculated from the following:    Height as of this encounter: 5' 10\" (1.778 m).    Weight as of this encounter: 246 lb 9.6 oz (111.9 kg).   Weight management plan: Discussed healthy diet and exercise guidelines and patient will follow up in 1 month in clinic to re-evaluate.        1. Essential hypertension with goal blood pressure less than 140/90  Chronic, stable today, elevated in ED. The " current medical regimen is effective;  continue present plan and medications.  - lisinopril (PRINIVIL/ZESTRIL) 20 MG tablet; Take 1 tablet (20 mg) by mouth daily  Dispense: 90 tablet; Refill: 3  - Basic metabolic panel  - metoprolol (LOPRESSOR) 50 MG tablet; Take 1 tablet (50 mg) by mouth 2 times daily  Dispense: 180 tablet; Refill: 3    2. Seborrheic dermatitis  Chronic bilateral ears. The current medical regimen is effective;  continue present plan and medications.  - triamcinolone (KENALOG) 0.1 % cream; Apply sparingly to ear canal two times daily.  Dispense: 30 g; Refill: 0    3. Encounter for screening mammogram for breast cancer  Due for annual screening.  - MA Screening Digital Bilateral; Future    4. Encounter for hepatitis C screening test for low risk patient  Due for one time screening.  - Hepatitis C antibody    5. Hyperlipidemia LDL goal <160  Chronic.   - Lipid Profile (Chol, Trig, HDL, LDL calc)    6. Atypical chest pain  Atypical with normal EKG, normal labs in ED. Risk factors reviewed. Possible cardiac vs GERD vs COPD. Will obtain stress ECHO. If normal then formal PFT.   - Exercise Stress Echocardiogram; Future    7. Gastroesophageal reflux disease without esophagitis  Start PPI recheck in 1 month.  - omeprazole (PRILOSEC) 20 MG CR capsule; Take 1 capsule (20 mg) by mouth daily  Dispense: 30 capsule; Refill: 1        FURTHER TESTING:       - mammogram       - Stress ECHO  FUTURE APPOINTMENTS:       - Follow-up visit in 1 month.  SELF MONITORING:       - Please check blood pressure readings daily  Work on weight loss  Regular exercise    Deandre Camejo MD  [facility]

## 2017-12-19 ENCOUNTER — RADIANT APPOINTMENT (OUTPATIENT)
Dept: MAMMOGRAPHY | Facility: OTHER | Age: 52
End: 2017-12-19
Attending: FAMILY MEDICINE
Payer: COMMERCIAL

## 2017-12-19 DIAGNOSIS — Z12.31 ENCOUNTER FOR SCREENING MAMMOGRAM FOR BREAST CANCER: ICD-10-CM

## 2017-12-19 PROCEDURE — G0202 SCR MAMMO BI INCL CAD: HCPCS | Mod: TC

## 2017-12-20 ENCOUNTER — HOSPITAL ENCOUNTER (OUTPATIENT)
Dept: CARDIOLOGY | Facility: CLINIC | Age: 52
Discharge: HOME | End: 2017-12-20
Attending: FAMILY MEDICINE | Admitting: FAMILY MEDICINE
Payer: COMMERCIAL

## 2017-12-20 DIAGNOSIS — R07.89 ATYPICAL CHEST PAIN: ICD-10-CM

## 2017-12-20 PROCEDURE — 93016 CV STRESS TEST SUPVJ ONLY: CPT | Performed by: INTERNAL MEDICINE

## 2017-12-20 PROCEDURE — 93321 DOPPLER ECHO F-UP/LMTD STD: CPT | Mod: 26 | Performed by: INTERNAL MEDICINE

## 2017-12-20 PROCEDURE — 40000264 ECHO STRESS WITH OPTISON

## 2017-12-20 PROCEDURE — 93350 STRESS TTE ONLY: CPT | Mod: 26 | Performed by: INTERNAL MEDICINE

## 2017-12-20 PROCEDURE — 93325 DOPPLER ECHO COLOR FLOW MAPG: CPT | Mod: 26 | Performed by: INTERNAL MEDICINE

## 2017-12-20 PROCEDURE — 93017 CV STRESS TEST TRACING ONLY: CPT | Performed by: REHABILITATION PRACTITIONER

## 2017-12-20 PROCEDURE — 25500064 ZZH RX 255 OP 636: Performed by: INTERNAL MEDICINE

## 2017-12-20 PROCEDURE — 93018 CV STRESS TEST I&R ONLY: CPT | Performed by: INTERNAL MEDICINE

## 2017-12-20 RX ADMIN — HUMAN ALBUMIN MICROSPHERES AND PERFLUTREN 4 ML: 10; .22 INJECTION, SOLUTION INTRAVENOUS at 14:34

## 2018-01-15 ENCOUNTER — OFFICE VISIT (OUTPATIENT)
Dept: FAMILY MEDICINE | Facility: OTHER | Age: 53
End: 2018-01-15
Payer: COMMERCIAL

## 2018-01-15 VITALS
DIASTOLIC BLOOD PRESSURE: 84 MMHG | BODY MASS INDEX: 35.51 KG/M2 | WEIGHT: 247.5 LBS | OXYGEN SATURATION: 100 % | SYSTOLIC BLOOD PRESSURE: 130 MMHG | TEMPERATURE: 97.9 F | RESPIRATION RATE: 16 BRPM | HEART RATE: 69 BPM

## 2018-01-15 DIAGNOSIS — R06.02 SHORTNESS OF BREATH: Primary | ICD-10-CM

## 2018-01-15 DIAGNOSIS — E66.01 MORBID OBESITY (H): ICD-10-CM

## 2018-01-15 LAB — HBA1C MFR BLD: 6 % (ref 4.3–6)

## 2018-01-15 PROCEDURE — 83036 HEMOGLOBIN GLYCOSYLATED A1C: CPT | Performed by: FAMILY MEDICINE

## 2018-01-15 PROCEDURE — 99214 OFFICE O/P EST MOD 30 MIN: CPT | Performed by: FAMILY MEDICINE

## 2018-01-15 PROCEDURE — 36415 COLL VENOUS BLD VENIPUNCTURE: CPT | Performed by: FAMILY MEDICINE

## 2018-01-15 ASSESSMENT — PAIN SCALES - GENERAL: PAINLEVEL: NO PAIN (0)

## 2018-01-15 NOTE — NURSING NOTE
"Chief Complaint   Patient presents with     Hypertension     RECHECK       Initial /84 (BP Location: Right arm, Patient Position: Chair, Cuff Size: Adult Large)  Pulse 69  Temp 97.9  F (36.6  C) (Temporal)  Resp 16  Wt 247 lb 8 oz (112.3 kg)  LMP 01/15/2008  SpO2 100%  BMI 35.51 kg/m2 Estimated body mass index is 35.51 kg/(m^2) as calculated from the following:    Height as of 12/18/17: 5' 10\" (1.778 m).    Weight as of this encounter: 247 lb 8 oz (112.3 kg).  Medication Reconciliation: complete     Lisa Carmona MA 1/15/2018  9:33 AM          "

## 2018-01-15 NOTE — MR AVS SNAPSHOT
After Visit Summary   1/15/2018    Minnie Chisholm    MRN: [id number]           Patient Information     Date Of Birth          1965        Visit Information        Provider Department      1/15/2018 9:40 AM Deandre Camejo MD [facility]        Today's Diagnoses     Shortness of breath    -  1    Morbid obesity (H)           Follow-ups after your visit        Follow-up notes from your care team     Return if symptoms worsen or fail to improve.      Future tests that were ordered for you today     Open Future Orders        Priority Expected Expires Ordered    General PFT Lab (Please always keep checked) Routine  1/15/2019 1/15/2018    Pulmonary Function Test Routine  1/15/2019 1/15/2018            Who to contact     If you have questions or need follow up information about today's clinic visit or your schedule please contact [facility] directly at [phone].  Normal or non-critical lab and imaging results will be communicated to you by [email or web address], letter or phone within 4 business days after the clinic has received the results. If you do not hear from us within 7 days, please contact the clinic through [email or web address] or phone. If you have a critical or abnormal lab result, we will notify you by phone as soon as possible.  Submit refill requests through Ciapple or call your pharmacy and they will forward the refill request to us. Please allow 3 business days for your refill to be completed.          Additional Information About Your Visit        MyChart Information     Ciapple gives you secure access to your electronic health record. If you see a primary care provider, you can also send messages to your care team and make appointments. If you have questions, please call your primary care clinic.  If you do not have a primary care provider, please call [phone] and they will assist you.        Care EveryWhere ID     This is your Care EveryWhere ID. This could be used by other  organizations to access your [place] medical records  [id number]        Your Vitals Were     Pulse Temperature Respirations Last Period Pulse Oximetry BMI (Body Mass Index)    69 97.9  F (36.6  C) (Temporal) 16 01/15/2008 100% 35.51 kg/m2       Blood Pressure from Last 3 Encounters:   01/15/18 130/84   12/18/17 132/84   12/14/17 109/76    Weight from Last 3 Encounters:   01/15/18 247 lb 8 oz (112.3 kg)   12/18/17 246 lb 9.6 oz (111.9 kg)   12/13/17 245 lb (111.1 kg)              We Performed the Following     **A1C FUTURE anytime        Primary Care Provider Office Phone # Fax #    Deandre Camejo [phone]       [address] [facility] [address]        Equal Access to Services     JC HAYES : Hadii mckenna coreas hadasho Soomaali, waaxda luqadaha, qaybta kaalmada adeaurelioyajulio, graciela cummings . So [facility] [phone].    ATENCIÓN: Si habla español, tiene a vazquez disposición servicios gratuitos de asistencia lingüística. Llame al [phone].    We comply with applicable federal civil rights laws and [place] laws. We do not discriminate on the basis of race, color, national origin, age, disability, sex, sexual orientation, or gender identity.            Thank you!     Thank you for choosing [facility]  for your care. Our goal is always to provide you with excellent care. Hearing back from our patients is one way we can continue to improve our services. Please take a few minutes to complete the written survey that you may receive in the mail after your visit with us. Thank you!             Your Updated Medication List - Protect others around you: Learn how to safely use, store and throw away your medicines at www.disposemymeds.org.          This list is accurate as of: 1/15/18 10:12 AM.  Always use your most recent med list.                   Brand Name Dispense Instructions for use Diagnosis    ASPIRIN PO      Take 81 mg by mouth        lisinopril 20 MG tablet     PRINIVIL/ZESTRIL    90 tablet    Take 1 tablet (20 mg) by mouth daily    Essential hypertension with goal blood pressure less than 140/90       metoprolol tartrate 50 MG tablet    LOPRESSOR    180 tablet    Take 1 tablet (50 mg) by mouth 2 times daily    Essential hypertension with goal blood pressure less than 140/90       nicotine polacrilex 4 MG gum    NICORETTE     1 PIECE OF GUM UP TO 10 TIMES PER DAY AS NEEDED        omeprazole 20 MG CR capsule    priLOSEC    30 capsule    Take 1 capsule (20 mg) by mouth daily    Gastroesophageal reflux disease without esophagitis       triamcinolone 0.1 % cream    KENALOG    30 g    Apply sparingly to ear canal two times daily.    Seborrheic dermatitis

## 2018-01-15 NOTE — PROGRESS NOTES
SUBJECTIVE:   Remy Odom is a 52 year old female who presents to clinic today for the following health issues:        Hypertension Follow-up      Outpatient blood pressures are being checked at home.  Results are 130 to 140 / 80.    Low Salt Diet: low salt        Amount of exercise or physical activity: 4-5 days/week for an average of 30-45 minutes    Problems taking medications regularly: No    Medication side effects: none    Diet: regular (no restrictions), low salt and diabetic    Concern - Shortness of breath  Onset: 2017    Description:   When exercising or stairs shortness of breath occurs    Intensity: mild    Progression of Symptoms:  worsening    Accompanying Signs & Symptoms:  Shortness of breath    Previous history of similar problem:   Stress ECHO 17 normal    [facility]  Echocardiography Laboratory  [address] [place] [address]        Name: REMY ODOM  MRN: [id number]  : 1965  Study Date: 2017 02:22 PM  Age: 52 yrs  Gender: Female  Patient Location: [facility]  Reason For Study: Atypical chest pain  History: HTN,Hyperlipidemia,Obesity,ex-smoker  Ordering Physician: HAO CALLEJAS  Referring Physician: HAO CALLEJAS  Performed By: Rebecca Wall     BSA: 2.5 m2  Height: 78 in  Weight: 245 lb  HR: 78  BP: 124/72 mmHg  _____________________________________________________________________________  __        Procedure  Stress Echo Complete. Contrast Optison.  _____________________________________________________________________________  __        Interpretation Summary  The patient exercised 7 minutes 26 seconds..  The patient exhibited no chest pain during exercise.  The EKG portion of this stress test was negative for inducible ischemia (see  echo results below).  5 beat run of monomorphic non sustained VT during recovery.  Normal resting wall motion and no stress-induced wall motion abnormality.  This was a normal stress echocardiogram  with no evidence of stress-induced  Ischemia.    CT chest in ED 12/13/17 with atelectasis    CT CHEST PULMONARY EMBOLISM W CONTRAST  12/13/2017 11:21 PM      HISTORY: Elevated D-dimer, chest pain.      TECHNIQUE: Volumetric acquisition of the chest after the  administration of 90 mL Isovue-370 IV contrast. Radiation dose for  this scan was reduced using automated exposure control, adjustment of  the mA and/or kV according to patient size, or iterative  reconstruction technique.      COMPARISON: None.     FINDINGS: No visualized pulmonary embolism. Normal heart size. No  thoracic aortic aneurysm. No acute infiltrates, pleural effusions, or  pneumothorax. Areas of minimal linear atelectasis or scarring in both  lungs. Emphysema. Cholecystectomy. No destructive bone lesions.         IMPRESSION:  1. No visualized pulmonary embolism or other acute findings.  2. Emphysema.    Precipitating factors:   Worsened by: while exercising or going up stairs    Alleviating factors:  Improved by: breathing exercises    Therapies Tried and outcome: breathing exercises seems to help.      Concern: Had hemoglobin A1C done was a future lab. Patient wasn't able to make it in to have it done when ordered.         Problem list and histories reviewed & adjusted, as indicated.  Additional history: as documented    Patient Active Problem List   Diagnosis     Disturbance of skin sensation     Esophageal reflux     Mammary duct ectasia     HYPERLIPIDEMIA LDL GOAL <160     Hypertension goal BP (blood pressure) < 140/90     Anxiety state     Morbid obesity (H)     Past Surgical History:   Procedure Laterality Date     ANTROSTOMY NASAL  5/22/2012    Procedure:ANTROSTOMY NASAL; Endoscopic Right maxillary sinus surgery exploration for right antrostomy and right submucousal resection of the turbinates; Surgeon:ALEXA JHAVERI; Location:[facility] OR     BREAST LUMPECTOMY, RT/LT  1/30/08    right breast lump removed     C LIGATE FALLOPIAN TUBE,POSTPARTUM       Tubal Ligation     C TOTAL ABD HYSTERECTOMY+BLAD REPR  6/16/2008     C TREAT ECTOPIC PREG,NON REMVAL      Tube & Ectopic Preg., Removal     CHOLECYSTECTOMY, LAPOROSCOPIC  10/20/06    Cholecystectomy, Laparoscopic     COLONOSCOPY  12/8/2010    COMBINED COLONOSCOPY, SINGLE BIOPSY/POLYPECTOMY BY BIOPSY performed by CATIE VICENTE at  GI     ENDOSCOPIC SINUS SURGERY  5/22/2012    Procedure:ENDOSCOPIC SINUS SURGERY; Surgeon:ALEXA JHAVERI; Location:[facility] OR     ENDOSCOPY  07/17/2006    Upper GI     HC EXCISION BREAST LESION W XRAY MARKER, OPEN SINGLE  01/29/08    right     HC PLACE NEEDLE WIRE PREOP, BREAST, INITIAL  01/29/08     HEMILAMINECTOMY, DISCECTOMY LUMBAR ONE LEVEL, COMBINED Left 4/1/2015    Procedure: COMBINED HEMILAMINECTOMY, DISCECTOMY LUMBAR ONE LEVEL;  Surgeon: Emilio Patten MD;  Location: [facility] OR     HYSTERECTOMY, PAP NO LONGER INDICATED       SURGICAL HISTORY OF -       mammary gland removal, left     TURBINOPLASTY  5/22/2012    Procedure:TURBINOPLASTY; Surgeon:ALEXA JHAVERI; Location: OR       Social History   Substance Use Topics     Smoking status: Former Smoker     Packs/day: 0.00     Years: 15.00     Types: Cigarettes     Quit date: 9/5/2011     Smokeless tobacco: Never Used      Comment: Is now just chewing nicotine gum, 10 pc. per day     Alcohol use 3.0 oz/week      Comment: rare     Family History   Problem Relation Age of Onset     CEREBROVASCULAR DISEASE Mother      [email or web address].     Anesthesia Reaction Mother      Respiratory arrest with anesthesia     DIABETES Maternal Grandfather      HEART DISEASE Maternal Grandfather      stephanie. Heart Failure     Alzheimer Disease Maternal Grandmother      Hypertension Brother      Lipids Brother      Hypertension Brother      Lipids Brother      Congenital Anomalies Brother      Neurologic Disorder Father      ALS     Cancer - colorectal Maternal Uncle      possible colitis         Current Outpatient Prescriptions   Medication Sig  Dispense Refill     lisinopril (PRINIVIL/ZESTRIL) 20 MG tablet Take 1 tablet (20 mg) by mouth daily 90 tablet 3     triamcinolone (KENALOG) 0.1 % cream Apply sparingly to ear canal two times daily. 30 g 0     omeprazole (PRILOSEC) 20 MG CR capsule Take 1 capsule (20 mg) by mouth daily 30 capsule 1     metoprolol (LOPRESSOR) 50 MG tablet Take 1 tablet (50 mg) by mouth 2 times daily 180 tablet 3     ASPIRIN PO Take 81 mg by mouth       NICOTINE POLACRILEX 4 MG MT GUM 1 PIECE OF GUM UP TO 10 TIMES PER DAY AS NEEDED       Allergies   Allergen Reactions     Sulfa Drugs Itching     Ciprofloxacin Rash     Recent Labs   Lab Test  01/15/18   0939  12/18/17   0958  12/13/17   2225  02/02/15   1522  02/01/15   1715   01/08/15   1049  08/12/13   1343   02/06/12   1548  11/01/11   1632   A1C  6.0   --    --    --    --    --    --    --    --    --    --    LDL   --   146*   --    --    --    --   147*  129   --   171*   --    HDL   --   40*   --    --    --    --   47*   --    --   40*   --    TRIG   --   161*   --    --    --    --   121   --    --   139   --    ALT   --    --   49  57*  58*   --    --    --    --    --    --    CR   --   0.77  0.61  0.83  0.77   < >  0.69   --    < >  0.67  0.75   GFRESTIMATED   --   78  >90  72  79   < >  89   --    < >  >90  82   GFRESTBLACK   --   >90  >90  88  >90   GFR Calc     < >  >90   GFR Calc     --    < >  >90  >90   POTASSIUM   --   4.0  3.8  3.7  4.0   < >  3.9   --    < >  3.9  4.0   TSH   --    --    --    --    --    --    --    --    --    --   1.31    < > = values in this interval not displayed.      BP Readings from Last 3 Encounters:   01/15/18 130/84   12/18/17 132/84   12/14/17 109/76    Wt Readings from Last 3 Encounters:   01/15/18 247 lb 8 oz (112.3 kg)   12/18/17 246 lb 9.6 oz (111.9 kg)   12/13/17 245 lb (111.1 kg)                  Labs reviewed in EPIC          Reviewed and updated as needed this visit by clinical staffTobacco   "Allergies  Meds  Problems  Med Hx  Surg Hx  Fam Hx  Soc Hx        Reviewed and updated as needed this visit by Provider  Allergies  Meds  Problems         ROS:  C: NEGATIVE for fever, chills, change in weight  E/M: NEGATIVE for ear, mouth and throat problems  R: NEGATIVE for significant cough or SOB  CV: POSITIVE for dyspnea on exertion and NEGATIVE for chest pain/chest pressure, cyanosis, diaphoresis, lower extremity edema, orthopnea, palpitations, paroxysmal nocturnal dyspnea, syncope or near-syncope and tachycardia  ROS otherwise negative    OBJECTIVE:     /84 (BP Location: Right arm, Patient Position: Chair, Cuff Size: Adult Large)  Pulse 69  Temp 97.9  F (36.6  C) (Temporal)  Resp 16  Wt 247 lb 8 oz (112.3 kg)  LMP 01/15/2008  SpO2 100%  BMI 35.51 kg/m2  Body mass index is 35.51 kg/(m^2).  GENERAL: healthy, alert and no distress  NECK: no adenopathy, no asymmetry, masses, or scars and thyroid normal to palpation  RESP: lungs clear to auscultation - no rales, rhonchi or wheezes  CV: regular rate and rhythm, normal S1 S2, no S3 or S4, no murmur, click or rub, no peripheral edema and peripheral pulses strong  ABDOMEN: soft, nontender, no hepatosplenomegaly, no masses and bowel sounds normal    Diagnostic Test Results:  Results for orders placed or performed in visit on 01/15/18 (from the past 24 hour(s))   **A1C FUTURE anytime   Result Value Ref Range    Hemoglobin A1C 6.0 4.3 - 6.0 %       ASSESSMENT/PLAN:       1. Morbid obesity (H)  BMI:   Estimated body mass index is 35.51 kg/(m^2) as calculated from the following:    Height as of 12/18/17: 5' 10\" (1.778 m).    Weight as of this encounter: 247 lb 8 oz (112.3 kg).   Weight management plan: Discussed healthy diet and exercise guidelines and patient will follow up in 3 months in clinic to re-evaluate.        2. Shortness of breath  Normal stress ECHO and normal hemoglobin with radiographic findings of COPD on CT chest. This may be limiting " her exercise ability. Will obtain PFT. If normal then most likely due to general deconditioning. If PFT abnormal then consider starting JESUS vs LABA and consider PT referral for Pulmonary rehab.    - General PFT Lab (Please always keep checked); Future  - Pulmonary Function Test; Future    FURTHER TESTING:       - PFT  FUTURE APPOINTMENTS:       - Follow-up as needed  Work on weight loss  Regular exercise    Deandre Camejo MD  [facility]

## 2018-01-24 ENCOUNTER — HOSPITAL ENCOUNTER (OUTPATIENT)
Dept: RESPIRATORY THERAPY | Facility: CLINIC | Age: 53
Discharge: HOME | End: 2018-01-24
Attending: FAMILY MEDICINE | Admitting: FAMILY MEDICINE
Payer: COMMERCIAL

## 2018-01-24 DIAGNOSIS — R06.02 SHORTNESS OF BREATH: ICD-10-CM

## 2018-01-24 PROCEDURE — 94010 BREATHING CAPACITY TEST: CPT

## 2018-01-24 PROCEDURE — 94060 EVALUATION OF WHEEZING: CPT

## 2018-01-24 PROCEDURE — 94726 PLETHYSMOGRAPHY LUNG VOLUMES: CPT

## 2018-01-24 PROCEDURE — 25000125 ZZHC RX 250: Performed by: FAMILY MEDICINE

## 2018-01-24 PROCEDURE — 94729 DIFFUSING CAPACITY: CPT

## 2018-01-24 RX ORDER — ALBUTEROL SULFATE 0.83 MG/ML
2.5 SOLUTION RESPIRATORY (INHALATION)
Status: COMPLETED | OUTPATIENT
Start: 2018-01-24 | End: 2018-01-24

## 2018-01-24 RX ADMIN — ALBUTEROL SULFATE 2.5 MG: 2.5 SOLUTION RESPIRATORY (INHALATION) at 15:15

## 2018-01-24 NOTE — PROGRESS NOTES
The FEV1, FEV1/FVC ratio and GHO55-69% are reduced indicating airway obstruction.  The inspiratory flow rates are reduced.  The airway resistance is normal.  The TLC, RV, FRC and RV/TLC ratio are all increased indicating overinflation and air trapping.  Following administration of bronchodilators, there is no significant response.  The diffusing capacity is high for the measured volume.  Maldistribution of ventilation is indicated by the difference between the alveolar volume and the total lung capacity.    Moderate airway obstruction is present.  The increased diffusing capacity would be consistent with asthma or a cardiovascular process such as left heart failure or shunting.  A clinical trial of bronchodilators may be beneficial in view of the airway obstruction.    IMPRESSION:  Moderate Airflow Obstruction   Increased Diffusion        This preliminary report should not be used clinically unless reviewed and signed by a physician.

## 2018-01-24 NOTE — PROGRESS NOTES
Pre-Bronch    Post-Bronch         Actual Pred %Pred  Actual %Pred %Chng       ---- SPIROMETRY ----                          FVC (L)  4.18 4.07 102   4.59 112 +9            FEV1 (L)  2.53 3.19 79   2.87 90 +13            FEV1/FVC (%) 60 79     63   +3            FEV1/SVC (%) 60 77                      FEV1/FEV6 (%) 65 82     67   +1            FEF Max (L/sec) 5.37 7.51 71   5.82 77 +8            FEF 25-75% (L/sec) 1.11 2.84 39   1.87 65 +68            FIVC (L) 3.49       3.52   +0            FIF Max (L/sec) 1.79 5.81 30   3.72 63 +107            Expiratory Time (sec) 10.76       10.93   +1            ----LUNG MECHANICS                           MVV (L/min)   107                      MEP (cmH2O)                          MIP (cmH2O)                          ---- LUNG VOLUMES ----                          SVC (L) 4.21 4.14 101                    IC (L) 4.01 3.40 117                    ERV (L) 0.20 0.74 27                    FRC(Pleth) (L) 5.32 3.04 175                    RV (Pleth) (L) 5.12 2.10 243                    TLC (Pleth) (L) 9.33 5.94 157                    RV/TLC (Pleth) (%) 55 38                      ---- DIFFUSION ----                          DLCOunc (ml/min/mmHg) 29.83 24.64 121                    DLCOcor (ml/min/mmHg) 29.92 24.64 121                    DL/VA (ml/min/mmHg/L) 4.72 4.07                      VA (L) 6.34 6.05 104                    IVC (L) 3.97                        Hgb (gm/dL) 13.3 12-18

## 2018-01-24 NOTE — DISCHARGE INSTRUCTIONS
Thank you for completing pulmonary function testing today.  All results will be scanned into your epic results for your doctor to review.  Please resume taking all your current prescribed medications and diet as directed by your provider.   If you have not heard from your provider about your testing within two weeks and do not have a follow-up appointment scheduled with them please contact your provider about any questions you have concerning your testing.   Thank you  [facility] Pulmonary Function [facility]

## 2018-01-30 DIAGNOSIS — I10 ESSENTIAL HYPERTENSION WITH GOAL BLOOD PRESSURE LESS THAN 140/90: ICD-10-CM

## 2018-01-30 RX ORDER — METOPROLOL TARTRATE 50 MG
TABLET ORAL
Qty: 60 TABLET | Refills: 0 | OUTPATIENT
Start: 2018-01-30

## 2018-01-30 NOTE — TELEPHONE ENCOUNTER
Prescription was sent 12/18/17 for #180 with 3 refills.  Pharmacy notified via E-Prescribe refusal.     Jessie Ruelas RN  [facility]

## 2018-01-30 NOTE — TELEPHONE ENCOUNTER
"Last Written Prescription Date:  12/18/2017  Last Fill Quantity: 180,  # refills: 3   Last Office Visit with [facility] provider:  1/15/2018   Future Office Visit:     Requested Prescriptions   Pending Prescriptions Disp Refills     metoprolol tartrate (LOPRESSOR) 50 MG tablet [Pharmacy Med Name: METOPROLOL TARTRATE 50MG TABS] 60 tablet 0     Sig: TAKE ONE TABLET BY MOUTH TWICE A DAY --NEED OFFICE VISIT FOR ANY FURTHER REFILLS    Beta-Blockers Protocol Passed    1/30/2018  8:48 AM       Passed - Blood pressure under 140/90    BP Readings from Last 3 Encounters:   01/15/18 130/84   12/18/17 132/84   12/14/17 109/76                Passed - Patient is age 6 or older       Passed - Recent or future visit with authorizing provider's specialty    Patient had office visit in the last year or has a visit in the next 30 days with authorizing provider.  See \"Patient Info\" tab in inbasket, or \"Choose Columns\" in Meds & Orders section of the refill encounter.               "

## 2018-01-31 PROBLEM — J44.9 CHRONIC OBSTRUCTIVE PULMONARY DISEASE (H): Status: ACTIVE | Noted: 2018-01-31

## 2018-02-01 LAB
DLCOCOR-%PRED-PRE: 121 %
DLCOCOR-PRE: 29.92 ML/MIN/MMHG
DLCOUNC-%PRED-PRE: 121 %
DLCOUNC-PRE: 29.83 ML/MIN/MMHG
DLCOUNC-PRED: 24.64 ML/MIN/MMHG
ERV-%PRED-PRE: 27 %
ERV-PRE: 0.2 L
ERV-PRED: 0.74 L
EXPTIME-PRE: 10.76 SEC
FEF2575-%PRED-POST: 65 %
FEF2575-%PRED-PRE: 39 %
FEF2575-POST: 1.87 L/SEC
FEF2575-PRE: 1.11 L/SEC
FEF2575-PRED: 2.84 L/SEC
FEFMAX-%PRED-PRE: 71 %
FEFMAX-PRE: 5.37 L/SEC
FEFMAX-PRED: 7.51 L/SEC
FEV1-%PRED-PRE: 79 %
FEV1-PRE: 2.53 L
FEV1FEV6-PRE: 65 %
FEV1FEV6-PRED: 82 %
FEV1FVC-PRE: 60 %
FEV1FVC-PRED: 79 %
FEV1SVC-PRE: 60 %
FEV1SVC-PRED: 77 %
FIFMAX-PRE: 1.79 L/SEC
FRCPLETH-%PRED-PRE: 175 %
FRCPLETH-PRE: 5.32 L
FRCPLETH-PRED: 3.04 L
FVC-%PRED-PRE: 102 %
FVC-PRE: 4.18 L
FVC-PRED: 4.07 L
GAW-%PRED-PRE: 46 %
GAW-PRE: 0.48 L/S/CMH2O
GAW-PRED: 1.03 L/S/CMH2O
IC-%PRED-PRE: 117 %
IC-PRE: 4.01 L
IC-PRED: 3.4 L
RVPLETH-%PRED-PRE: 243 %
RVPLETH-PRE: 5.12 L
RVPLETH-PRED: 2.1 L
SGAW-%PRED-PRE: 88 %
SGAW-PRE: 0.09 1/CMH2O*S
SGAW-PRED: 0.1 1/CMH2O*S
SRAW-%PRED-PRE: 236 %
SRAW-PRE: 11.28 CMH2O*S
SRAW-PRED: 4.76 CMH2O*S
TLCPLETH-%PRED-PRE: 157 %
TLCPLETH-PRE: 9.33 L
TLCPLETH-PRED: 5.94 L
VA-%PRED-PRE: 104 %
VA-PRE: 6.34 L
VC-%PRED-PRE: 101 %
VC-PRE: 4.21 L
VC-PRED: 4.14 L

## 2018-02-15 ENCOUNTER — TELEPHONE (OUTPATIENT)
Dept: FAMILY MEDICINE | Facility: OTHER | Age: 53
End: 2018-02-15

## 2018-02-15 NOTE — TELEPHONE ENCOUNTER
Reason for Call:  Other appointment    Detailed comments: patient needs to come in for a follow up for her pulmonary functions tests and needs to come in before the end of February as her insurance will be ending and she is not sure when she will have health insurance. The best dates for her are tomorrow 2/16, or 2/23, 2/26 or /28. Please call and advise     Phone Number Patient can be reached at: Cell number on file:    Telephone Information:   Mobile [phone]       Best Time: Any      Can we leave a detailed message on this number? YES    Call taken on 2/15/2018 at 2:57 PM by Ramonita Armijo

## 2018-02-15 NOTE — TELEPHONE ENCOUNTER
OK [address]bi [address]. Only appointment slot. Please call patient  to schedule time.  Electronically signed by Deandre Camejo MD

## 2018-02-16 ENCOUNTER — OFFICE VISIT (OUTPATIENT)
Dept: FAMILY MEDICINE | Facility: OTHER | Age: 53
End: 2018-02-16
Payer: COMMERCIAL

## 2018-02-16 VITALS
HEART RATE: 97 BPM | BODY MASS INDEX: 34.09 KG/M2 | DIASTOLIC BLOOD PRESSURE: 80 MMHG | RESPIRATION RATE: 16 BRPM | OXYGEN SATURATION: 96 % | SYSTOLIC BLOOD PRESSURE: 122 MMHG | TEMPERATURE: 98.2 F | WEIGHT: 237.6 LBS

## 2018-02-16 DIAGNOSIS — J44.9 CHRONIC OBSTRUCTIVE PULMONARY DISEASE, UNSPECIFIED COPD TYPE (H): Primary | ICD-10-CM

## 2018-02-16 DIAGNOSIS — K21.9 GASTROESOPHAGEAL REFLUX DISEASE WITHOUT ESOPHAGITIS: ICD-10-CM

## 2018-02-16 PROCEDURE — 99214 OFFICE O/P EST MOD 30 MIN: CPT | Performed by: FAMILY MEDICINE

## 2018-02-16 RX ORDER — ALBUTEROL SULFATE 90 UG/1
2 AEROSOL, METERED RESPIRATORY (INHALATION) EVERY 4 HOURS PRN
Qty: 1 INHALER | Refills: 3 | Status: SHIPPED | OUTPATIENT
Start: 2018-02-16

## 2018-02-16 ASSESSMENT — PAIN SCALES - GENERAL: PAINLEVEL: NO PAIN (0)

## 2018-02-16 NOTE — PATIENT INSTRUCTIONS
What is COPD?  COPD stands for chronic obstructive pulmonary disease. It means the airways in your lungs are blocked (obstructed). Because of this, it is hard to breathe. You may have trouble with daily activities because of shortness of breath. Over time the shortness of breath usually worsens making it more and more difficult to take care of yourself and take part in activities. Chronic bronchitis and emphysema are two common types of COPD.  What happens in chronic bronchitis?    The cells in the airways make more mucus than normal. The mucus builds up, narrowing the airways. This means less air travels into and out of the lungs. The lining of the airways may also become inflamed (swollen) and causes the airways to narrow even more.        What happens in emphysema?    The small airways are damaged and lose their stretchiness. The airways collapse when you exhale, causing air to get trapped in the air sacs. This means that less oxygen enters the blood vessels and less oxygen is delivered to all of the cells of your body. This makes it hard to breathe.     Damage to cilia    Cilia are small hairs that line and protect the airways. Smoking damages the cilia. Damaged cilia can t sweep mucus and particles away. Some of the cilia are destroyed. This damage worsens COPD.  How did I get COPD?  Most people get COPD from smoking. Cigarette smoke damages lungs, which can develop into COPD over many years.  How COPD affects you  COPD makes you work harder to breathe. Air may get trapped in the lungs, which prevents your lungs from filling completely with fresh oxygen-filled air when you inhale (breathe in). It's harder to take deep breaths especially when you are active and start breathing faster. Over time, your lungs may become enlarged filling the lung with air that does not transfer oxygen into the blood. These problems cause you to have shortness of breath (also called dyspnea). Wheezing (hoarse, whistling breathing),  chronic cough, and fatigue (feeling tired and worn out) are also common.   Date Last Reviewed: 5/1/2016 2000-2017 [employer]. [address] [facility], [place] [address]. All rights reserved. This information is not intended as a substitute for professional medical care. Always follow your healthcare professional's instructions.        Treatment for COPD    Your healthcare provider will prescribe the best treatments for your COPD.  Treatment  Recommendations include the following:    Medicines. Some medicines help relieve symptoms when you have them. Others are taken daily to control inflammation in the lungs. Always take your medicines as prescribed. Learn the names of your medicines, as well as how and when to use them.    Oxygen therapy. Oxygen may be prescribed if tests show that your blood contains too little oxygen.    Smoking. If you smoke, quit. Smoking is the main cause of COPD. Quitting will help you be able to better manage your COPD. Ask your healthcare provider about ways to help you quit smoking.    Avoiding infections. Infections, like a cold or the flu, can cause your symptoms to worsen. Try to stay away from people who are sick. Wash your hands often. And, ask your healthcare provider about vaccines for the flu and pneumonia.  Coping with shortness of breath  Coping tips include the following:    Exercise. Try to be as active as possible. This will improve energy levels and strengthen your muscles, so you can do more.    Breathing techniques. Ask your healthcare provider or nurse to show you how to do pursed-lip breathing.    Balance rest and activity. Each day, try to balance rest periods with activity. For example, you might start the day with getting dressed and eating breakfast, then relax and read the paper. After that, take a brief walk. And then sit with your feet up for a while.    Pulmonary rehabilitation. Ask your provider, or call your local hospital to find out about  pulmonary rehab programs. The programs help with managing your disease, breathing techniques, exercise, support and counseling.    Healthy eating. Eating a healthy, balanced diet and making an effort to maintain your ideal weight are important to staying as healthy as possible. Make sure you have a lot of fruit and vegetables every day, as well as balanced portions of whole grains, lean meats and fish, and low-fat dairy products.  Date Last Reviewed: 5/1/2016 2000-2017 [employer]. [address] [place] [address]. All rights reserved. This information is not intended as a substitute for professional medical care. Always follow your healthcare professional's instructions.

## 2018-02-16 NOTE — MR AVS SNAPSHOT
After Visit Summary   2/16/2018    Minnie Chisholm    MRN: [id number]           Patient Information     Date Of Birth          1965        Visit Information        Provider Department      2/16/2018 4:30 PM Deandre Camejo MD [facility]        Today's Diagnoses     Chronic obstructive pulmonary disease, unspecified COPD type (H)    -  1    Gastroesophageal reflux disease without esophagitis          Care Instructions      What is COPD?  COPD stands for chronic obstructive pulmonary disease. It means the airways in your lungs are blocked (obstructed). Because of this, it is hard to breathe. You may have trouble with daily activities because of shortness of breath. Over time the shortness of breath usually worsens making it more and more difficult to take care of yourself and take part in activities. Chronic bronchitis and emphysema are two common types of COPD.  What happens in chronic bronchitis?    The cells in the airways make more mucus than normal. The mucus builds up, narrowing the airways. This means less air travels into and out of the lungs. The lining of the airways may also become inflamed (swollen) and causes the airways to narrow even more.        What happens in emphysema?    The small airways are damaged and lose their stretchiness. The airways collapse when you exhale, causing air to get trapped in the air sacs. This means that less oxygen enters the blood vessels and less oxygen is delivered to all of the cells of your body. This makes it hard to breathe.     Damage to cilia    Cilia are small hairs that line and protect the airways. Smoking damages the cilia. Damaged cilia can t sweep mucus and particles away. Some of the cilia are destroyed. This damage worsens COPD.  How did I get COPD?  Most people get COPD from smoking. Cigarette smoke damages lungs, which can develop into COPD over many years.  How COPD affects you  COPD makes you work harder to breathe. Air may  get trapped in the lungs, which prevents your lungs from filling completely with fresh oxygen-filled air when you inhale (breathe in). It's harder to take deep breaths especially when you are active and start breathing faster. Over time, your lungs may become enlarged filling the lung with air that does not transfer oxygen into the blood. These problems cause you to have shortness of breath (also called dyspnea). Wheezing (hoarse, whistling breathing), chronic cough, and fatigue (feeling tired and worn out) are also common.   Date Last Reviewed: 5/1/2016 2000-2017 [employer]. [address] [place] [address], [place] [address]. All rights reserved. This information is not intended as a substitute for professional medical care. Always follow your healthcare professional's instructions.        Treatment for COPD    Your healthcare provider will prescribe the best treatments for your COPD.  Treatment  Recommendations include the following:    Medicines. Some medicines help relieve symptoms when you have them. Others are taken daily to control inflammation in the lungs. Always take your medicines as prescribed. Learn the names of your medicines, as well as how and when to use them.    Oxygen therapy. Oxygen may be prescribed if tests show that your blood contains too little oxygen.    Smoking. If you smoke, quit. Smoking is the main cause of COPD. Quitting will help you be able to better manage your COPD. Ask your healthcare provider about ways to help you quit smoking.    Avoiding infections. Infections, like a cold or the flu, can cause your symptoms to worsen. Try to stay away from people who are sick. Wash your hands often. And, ask your healthcare provider about vaccines for the flu and pneumonia.  Coping with shortness of breath  Coping tips include the following:    Exercise. Try to be as active as possible. This will improve energy levels and strengthen your muscles, so you can do more.    Breathing  techniques. Ask your healthcare provider or nurse to show you how to do pursed-lip breathing.    Balance rest and activity. Each day, try to balance rest periods with activity. For example, you might start the day with getting dressed and eating breakfast, then relax and read the paper. After that, take a brief walk. And then sit with your feet up for a while.    Pulmonary rehabilitation. Ask your provider, or call your local hospital to find out about pulmonary rehab programs. The programs help with managing your disease, breathing techniques, exercise, support and counseling.    Healthy eating. Eating a healthy, balanced diet and making an effort to maintain your ideal weight are important to staying as healthy as possible. Make sure you have a lot of fruit and vegetables every day, as well as balanced portions of whole grains, lean meats and fish, and low-fat dairy products.  Date Last Reviewed: 5/1/2016 2000-2017 [employer]. [address] [place] [address]. All rights reserved. This information is not intended as a substitute for professional medical care. Always follow your healthcare professional's instructions.                Follow-ups after your visit        Follow-up notes from your care team     Return in about 6 weeks (around 3/30/2018), or if symptoms worsen or fail to improve.      Who to contact     If you have questions or need follow up information about today's clinic visit or your schedule please contact [facility] directly at [phone].  Normal or non-critical lab and imaging results will be communicated to you by MyChart, letter or phone within 4 business days after the clinic has received the results. If you do not hear from us within 7 days, please contact the clinic through [employer] or phone. If you have a critical or abnormal lab result, we will notify you by phone as soon as possible.  Submit refill requests through [employer] or call your pharmacy  and they will forward the refill request to us. Please allow 3 business days for your refill to be completed.          Additional Information About Your Visit        [employer] Information     Footnote gives you secure access to your electronic health record. If you see a primary care provider, you can also send messages to your care team and make appointments. If you have questions, please call your primary care clinic.  If you do not have a primary care provider, please call [phone] and they will assist you.        Care EveryWhere ID     This is your Care EveryWhere ID. This could be used by other organizations to access your [place] medical records  [id number]        Your Vitals Were     Pulse Temperature Respirations Last Period Pulse Oximetry BMI (Body Mass Index)    97 98.2  F (36.8  C) (Temporal) 16 01/23/2008 96% 34.09 kg/m2       Blood Pressure from Last 3 Encounters:   02/16/18 122/80   01/15/18 130/84   12/18/17 132/84    Weight from Last 3 Encounters:   02/16/18 237 lb 9.6 oz (107.8 kg)   01/15/18 247 lb 8 oz (112.3 kg)   12/18/17 246 lb 9.6 oz (111.9 kg)              Today, you had the following     No orders found for display         Today's Medication Changes          These changes are accurate as of 2/16/18  5:07 PM.  If you have any questions, ask your nurse or doctor.               Start taking these medicines.        Dose/Directions    albuterol 108 (90 BASE) MCG/ACT Inhaler   Commonly known as:  PROAIR HFA   Used for:  Chronic obstructive pulmonary disease, unspecified COPD type (H)   Started by:  Deandre Camejo MD        Dose:  2 puff   Inhale 2 puffs into the lungs every 4 hours as needed for shortness of breath / dyspnea or wheezing   Quantity:  1 Inhaler   Refills:  3       order for DME   Used for:  Chronic obstructive pulmonary disease, unspecified COPD type (H)   Started by:  Deandre Camejo MD        Equipment being ordered: MDI spacer   Quantity:  1 each   Refills:  0             Where to get your medicines      These medications were sent to [facility] - [place], [place] - [address]   [address] [facility] [address]     Phone:  [phone]     albuterol 108 (90 BASE) MCG/ACT Inhaler    omeprazole 20 MG CR capsule         Some of these will need a paper prescription and others can be bought over the counter.  Ask your nurse if you have questions.     Bring a paper prescription for each of these medications     order for DME                Primary Care Provider Office Phone # Fax #    Deandre Camejo [phone]       [address] [facility] [address]        Equal Access to Services     [facility] : Hadii mckenna coreas hadasho Soomaali, waaxda luqadaha, qaybta kaalmada adeaurelioyada, graciela cummings . So [facility] [phone].    ATENCIÓN: Si habla español, tiene a vazquez disposición servicios gratuitos de asistencia lingüística. Llame al [phone].    We comply with applicable federal civil rights laws and [place] laws. We do not discriminate on the basis of race, color, national origin, age, disability, sex, sexual orientation, or gender identity.            Thank you!     Thank you for choosing [facility]  for your care. Our goal is always to provide you with excellent care. Hearing back from our patients is one way we can continue to improve our services. Please take a few minutes to complete the written survey that you may receive in the mail after your visit with us. Thank you!             Your Updated Medication List - Protect others around you: Learn how to safely use, store and throw away your medicines at www.disposemymeds.org.          This list is accurate as of 2/16/18  5:07 PM.  Always use your most recent med list.                   Brand Name Dispense Instructions for use Diagnosis    albuterol 108 (90 BASE) MCG/ACT Inhaler    PROAIR HFA    1 Inhaler    Inhale 2 puffs into the lungs every 4 hours as needed for shortness of  breath / dyspnea or wheezing    Chronic obstructive pulmonary disease, unspecified COPD type (H)       ASPIRIN PO      Take 81 mg by mouth        lisinopril 20 MG tablet    PRINIVIL/ZESTRIL    90 tablet    Take 1 tablet (20 mg) by mouth daily    Essential hypertension with goal blood pressure less than 140/90       metoprolol tartrate 50 MG tablet    LOPRESSOR    180 tablet    Take 1 tablet (50 mg) by mouth 2 times daily    Essential hypertension with goal blood pressure less than 140/90       nicotine polacrilex 4 MG gum    NICORETTE     1 PIECE OF GUM UP TO 10 TIMES PER DAY AS NEEDED        omeprazole 20 MG CR capsule    priLOSEC    30 capsule    Take 1 capsule (20 mg) by mouth daily    Gastroesophageal reflux disease without esophagitis       order for DME     1 each    Equipment being ordered: MDI spacer    Chronic obstructive pulmonary disease, unspecified COPD type (H)       triamcinolone 0.1 % cream    KENALOG    30 g    Apply sparingly to ear canal two times daily.    Seborrheic dermatitis

## 2018-02-16 NOTE — NURSING NOTE
"Chief Complaint   Patient presents with     RECHECK       Initial /80 (BP Location: Right arm, Patient Position: Chair, Cuff Size: Adult Large)  Pulse 97  Temp 98.2  F (36.8  C) (Temporal)  Resp 16  Wt 237 lb 9.6 oz (107.8 kg)  LMP 01/23/2008  SpO2 96%  BMI 34.09 kg/m2 Estimated body mass index is 34.09 kg/(m^2) as calculated from the following:    Height as of 12/18/17: 5' 10\" (1.778 m).    Weight as of this encounter: 237 lb 9.6 oz (107.8 kg).  Medication Reconciliation: complete     Lisa Carmona MA 2/16/2018  4:39 PM          "

## 2018-02-16 NOTE — PROGRESS NOTES
SUBJECTIVE:   Minnie Chisholm is a 52 year old female who presents to clinic today for the following health issues:        Follow up on Pulmonary Functions tests and medications that are available.     COPD Follow-Up    Symptoms are currently: stable    Current fatigue or dyspnea with ambulation: stable     Shortness of breath: stable    Increased or change in Cough/Sputum: No    Fever(s): No    Baseline ambulation without stopping to rest:  3-4 blocks. Able to walk up 2 flights of stairs without stopping to rest.    Any ER/UC or hospital admissions since your last visit? No     History   Smoking Status     Former Smoker     Packs/day: 0.00     Years: 15.00     Types: Cigarettes     Quit date: 9/5/2011   Smokeless Tobacco     Never Used     Comment: Is now just chewing nicotine gum, 10 pc. per day       Results for orders placed or performed during the hospital encounter of 01/24/18   General PFT Lab (Please always keep checked)   Result Value Ref Range    FVC-Pred 4.07 L    FVC-Pre 4.18 L    FVC-%Pred-Pre 102 %    FEV1-Pre 2.53 L    FEV1-%Pred-Pre 79 %    FEV1FVC-Pred 79 %    FEV1FVC-Pre 60 %    FEFMax-Pred 7.51 L/sec    FEFMax-Pre 5.37 L/sec    FEFMax-%Pred-Pre 71 %    FEF2575-Pred 2.84 L/sec    FEF2575-Pre 1.11 L/sec    ZXU8706-%Pred-Pre 39 %    FEF2575-Post 1.87 L/sec    GUM4875-%Pred-Post 65 %    ExpTime-Pre 10.76 sec    FIFMax-Pre 1.79 L/sec    VC-Pred 4.14 L    VC-Pre 4.21 L    VC-%Pred-Pre 101 %    IC-Pred 3.40 L    IC-Pre 4.01 L    IC-%Pred-Pre 117 %    ERV-Pred 0.74 L    ERV-Pre 0.20 L    ERV-%Pred-Pre 27 %    FEV1FEV6-Pred 82 %    FEV1FEV6-Pre 65 %    FRCPleth-Pred 3.04 L    FRCPleth-Pre 5.32 L    FRCPleth-%Pred-Pre 175 %    RVPleth-Pred 2.10 L    RVPleth-Pre 5.12 L    RVPleth-%Pred-Pre 243 %    TLCPleth-Pred 5.94 L    TLCPleth-Pre 9.33 L    TLCPleth-%Pred-Pre 157 %    Gaw-Pred 1.03 L/s/cmH2O    Gaw-Pre 0.48 L/s/cmH2O    Gaw-%Pred-Pre 46 %    sGaw-Pred 0.10 1/cmH2O*s    sGaw-Pre 0.09 1/cmH2O*s     sGaw-%Pred-Pre 88 %    sRaw-Pred 4.76 cmH2O*s    sRaw-Pre 11.28 cmH2O*s    sRaw-%Pred-Pre 236 %    DLCOunc-Pred 24.64 ml/min/mmHg    DLCOunc-Pre 29.83 ml/min/mmHg    DLCOunc-%Pred-Pre 121 %    DLCOcor-Pre 29.92 ml/min/mmHg    DLCOcor-%Pred-Pre 121 %    VA-Pre 6.34 L    VA-%Pred-Pre 104 %    FEV1SVC-Pred 77 %    FEV1SVC-Pre 60 %    Narrative    The FEV1 and FVC are within normal limits. The FEV1/FVC ratio is reduced.  The increased TLC and RV/TLC ratio indicates hyperinflation and air trapping.  Following administration of bronchodilators, there is a significant response as indicated by the   increased FEV1.    The diffusing capacity is normal.  IMPRESSION:  Mild airflow obstruction with hyperinflation and airtrapping, and significant bronchodilator response.   ____________________________________________Elida Russell  The FEV1 and FVC are within normal limits. The FEV1/FVC ratio is reduced.  The increased TLC and RV/TLC ratio indicates hyperinflation and air trapping.  Following administration of bronchodilators, there is a significant response as indicated by the   increased FEV1.    The diffusing capacity is normal.  IMPRESSION:  Mild airflow obstruction with hyperinflation and airtrapping, and significant bronchodilator response.   ____________________________________________Elida Russell       Problem list and histories reviewed & adjusted, as indicated.  Additional history: Recent diagnosis. Patient wanting to initiate therapy as she will be losing health insurance.    Patient Active Problem List   Diagnosis     Disturbance of skin sensation     Esophageal reflux     Mammary duct ectasia     HYPERLIPIDEMIA LDL GOAL <160     Hypertension goal BP (blood pressure) < 140/90     Anxiety state     Morbid obesity (H)     Chronic obstructive pulmonary disease (H)     Past Surgical History:   Procedure Laterality Date     ANTROSTOMY NASAL  5/22/2012    Procedure:ANTROSTOMY NASAL; Endoscopic Right maxillary sinus  surgery exploration for right antrostomy and right submucousal resection of the turbinates; Surgeon:ALEXA JHAVERI; Location:[facility] OR     BREAST LUMPECTOMY, RT/LT  1/30/08    right breast lump removed     C LIGATE FALLOPIAN TUBE,POSTPARTUM      Tubal Ligation     C TOTAL ABD HYSTERECTOMY+BLAD REPR  6/16/2008     C TREAT ECTOPIC PREG,NON REMVAL      Tube & Ectopic Preg., Removal     CHOLECYSTECTOMY, LAPOROSCOPIC  10/20/06    Cholecystectomy, Laparoscopic     COLONOSCOPY  12/8/2010    COMBINED COLONOSCOPY, SINGLE BIOPSY/POLYPECTOMY BY BIOPSY performed by CATIE VICENTE at  GI     ENDOSCOPIC SINUS SURGERY  5/22/2012    Procedure:ENDOSCOPIC SINUS SURGERY; Surgeon:ALEXA JHAVERI; Location: OR     ENDOSCOPY  07/17/2006    Upper GI     HC EXCISION BREAST LESION W XRAY MARKER, OPEN SINGLE  01/29/08    right     HC PLACE NEEDLE WIRE PREOP, BREAST, INITIAL  01/29/08     HEMILAMINECTOMY, DISCECTOMY LUMBAR ONE LEVEL, COMBINED Left 4/1/2015    Procedure: COMBINED HEMILAMINECTOMY, DISCECTOMY LUMBAR ONE LEVEL;  Surgeon: Emilio Patten MD;  Location: [facility] OR     HYSTERECTOMY, PAP NO LONGER INDICATED       SURGICAL HISTORY OF -       mammary gland removal, left     TURBINOPLASTY  5/22/2012    Procedure:TURBINOPLASTY; Surgeon:ALEXA JHAVERI; Location: OR       Social History   Substance Use Topics     Smoking status: Former Smoker     Packs/day: 0.00     Years: 15.00     Types: Cigarettes     Quit date: 9/5/2011     Smokeless tobacco: Never Used      Comment: Is now just chewing nicotine gum, 10 pc. per day     Alcohol use 3.0 oz/week      Comment: rare     Family History   Problem Relation Age of Onset     CEREBROVASCULAR DISEASE Mother      [email or web address].     Anesthesia Reaction Mother      Respiratory arrest with anesthesia     DIABETES Maternal Grandfather      HEART DISEASE Maternal Grandfather      stephanie. Heart Failure     Alzheimer Disease Maternal Grandmother      Hypertension Brother      Lipids Brother       Hypertension Brother      Lipids Brother      Congenital Anomalies Brother      Neurologic Disorder Father      ALS     Cancer - colorectal Maternal Uncle      possible colitis         Current Outpatient Prescriptions   Medication Sig Dispense Refill     lisinopril (PRINIVIL/ZESTRIL) 20 MG tablet Take 1 tablet (20 mg) by mouth daily 90 tablet 3     triamcinolone (KENALOG) 0.1 % cream Apply sparingly to ear canal two times daily. 30 g 0     omeprazole (PRILOSEC) 20 MG CR capsule Take 1 capsule (20 mg) by mouth daily 30 capsule 1     metoprolol (LOPRESSOR) 50 MG tablet Take 1 tablet (50 mg) by mouth 2 times daily 180 tablet 3     ASPIRIN PO Take 81 mg by mouth       NICOTINE POLACRILEX 4 MG MT GUM 1 PIECE OF GUM UP TO 10 TIMES PER DAY AS NEEDED       Allergies   Allergen Reactions     Sulfa Drugs Itching     Ciprofloxacin Rash     Recent Labs   Lab Test  01/15/18   0939  12/18/17   0958  12/13/17   2225  02/02/15   1522  02/01/15   1715   01/08/15   1049  08/12/13   1343   02/06/12   1548  11/01/11   1632   A1C  6.0   --    --    --    --    --    --    --    --    --    --    LDL   --   146*   --    --    --    --   147*  129   --   171*   --    HDL   --   40*   --    --    --    --   47*   --    --   40*   --    TRIG   --   161*   --    --    --    --   121   --    --   139   --    ALT   --    --   49  57*  58*   --    --    --    --    --    --    CR   --   0.77  0.61  0.83  0.77   < >  0.69   --    < >  0.67  0.75   GFRESTIMATED   --   78  >90  72  79   < >  89   --    < >  >90  82   GFRESTBLACK   --   >90  >90  88  >90   GFR Calc     < >  >90   GFR Calc     --    < >  >90  >90   POTASSIUM   --   4.0  3.8  3.7  4.0   < >  3.9   --    < >  3.9  4.0   TSH   --    --    --    --    --    --    --    --    --    --   1.31    < > = values in this interval not displayed.      BP Readings from Last 3 Encounters:   02/16/18 122/80   01/15/18 130/84   12/18/17 132/84    Wt Readings from  "Last 3 Encounters:   02/16/18 237 lb 9.6 oz (107.8 kg)   01/15/18 247 lb 8 oz (112.3 kg)   12/18/17 246 lb 9.6 oz (111.9 kg)                  Labs reviewed in EPIC    Reviewed and updated as needed this visit by clinical staff  Tobacco  Allergies  Meds  Problems  Med Hx  Surg Hx  Fam Hx  Soc Hx        Reviewed and updated as needed this visit by Provider  Allergies  Meds  Problems         ROS:  CONSTITUTIONAL: NEGATIVE for fever, chills, change in weight  ENT/MOUTH: NEGATIVE for ear, mouth and throat problems  RESP:NEGATIVE for significant cough or SOB and POSITIVE for dyspnea on exertion and Hx COPD  CV: NEGATIVE for chest pain, palpitations or peripheral edema  ROS otherwise negative    OBJECTIVE:     /80 (BP Location: Right arm, Patient Position: Chair, Cuff Size: Adult Large)  Pulse 97  Temp 98.2  F (36.8  C) (Temporal)  Resp 16  Wt 237 lb 9.6 oz (107.8 kg)  LMP 01/23/2008  SpO2 96%  BMI 34.09 kg/m2  Body mass index is 34.09 kg/(m^2).  GENERAL: alert, no distress and obese  NECK: no adenopathy, no asymmetry, masses, or scars and thyroid normal to palpation  RESP: lungs clear to auscultation - no rales, rhonchi or wheezes  CV: regular rate and rhythm, normal S1 S2, no S3 or S4, no murmur, click or rub, no peripheral edema and peripheral pulses strong  MS: no gross musculoskeletal defects noted, no edema    Diagnostic Test Results:  none     ASSESSMENT/PLAN:     COPD: Moderate = FeV1 < 79% -50%, controlled  Associated with the following complications:  None    Plan:  Medications: Long acting bronchodilator inhaler:  Will consider at later date is JESUS not effective  Short acting bronchodilator inhaler: Albuterol      COPD education: www.lungmn.org      BMI:   Estimated body mass index is 34.09 kg/(m^2) as calculated from the following:    Height as of 12/18/17: 5' 10\" (1.778 m).    Weight as of this encounter: 237 lb 9.6 oz (107.8 kg).   Weight management plan: Discussed healthy diet and " exercise guidelines and patient will follow up in 1 month in clinic to re-evaluate.      FUTURE APPOINTMENTS:       - Follow-up visit in 4-6 weeks.  Work on weight loss  Regular exercise  Patient Instructions     What is COPD?  COPD stands for chronic obstructive pulmonary disease. It means the airways in your lungs are blocked (obstructed). Because of this, it is hard to breathe. You may have trouble with daily activities because of shortness of breath. Over time the shortness of breath usually worsens making it more and more difficult to take care of yourself and take part in activities. Chronic bronchitis and emphysema are two common types of COPD.  What happens in chronic bronchitis?    The cells in the airways make more mucus than normal. The mucus builds up, narrowing the airways. This means less air travels into and out of the lungs. The lining of the airways may also become inflamed (swollen) and causes the airways to narrow even more.        What happens in emphysema?    The small airways are damaged and lose their stretchiness. The airways collapse when you exhale, causing air to get trapped in the air sacs. This means that less oxygen enters the blood vessels and less oxygen is delivered to all of the cells of your body. This makes it hard to breathe.     Damage to cilia    Cilia are small hairs that line and protect the airways. Smoking damages the cilia. Damaged cilia can t sweep mucus and particles away. Some of the cilia are destroyed. This damage worsens COPD.  How did I get COPD?  Most people get COPD from smoking. Cigarette smoke damages lungs, which can develop into COPD over many years.  How COPD affects you  COPD makes you work harder to breathe. Air may get trapped in the lungs, which prevents your lungs from filling completely with fresh oxygen-filled air when you inhale (breathe in). It's harder to take deep breaths especially when you are active and start breathing faster. Over time, your  lungs may become enlarged filling the lung with air that does not transfer oxygen into the blood. These problems cause you to have shortness of breath (also called dyspnea). Wheezing (hoarse, whistling breathing), chronic cough, and fatigue (feeling tired and worn out) are also common.   Date Last Reviewed: 5/1/2016 2000-2017 [employer]. [address] [place] [address]. All rights reserved. This information is not intended as a substitute for professional medical care. Always follow your healthcare professional's instructions.        Treatment for COPD    Your healthcare provider will prescribe the best treatments for your COPD.  Treatment  Recommendations include the following:    Medicines. Some medicines help relieve symptoms when you have them. Others are taken daily to control inflammation in the lungs. Always take your medicines as prescribed. Learn the names of your medicines, as well as how and when to use them.    Oxygen therapy. Oxygen may be prescribed if tests show that your blood contains too little oxygen.    Smoking. If you smoke, quit. Smoking is the main cause of COPD. Quitting will help you be able to better manage your COPD. Ask your healthcare provider about ways to help you quit smoking.    Avoiding infections. Infections, like a cold or the flu, can cause your symptoms to worsen. Try to stay away from people who are sick. Wash your hands often. And, ask your healthcare provider about vaccines for the flu and pneumonia.  Coping with shortness of breath  Coping tips include the following:    Exercise. Try to be as active as possible. This will improve energy levels and strengthen your muscles, so you can do more.    Breathing techniques. Ask your healthcare provider or nurse to show you how to do pursed-lip breathing.    Balance rest and activity. Each day, try to balance rest periods with activity. For example, you might start the day with getting dressed and eating  breakfast, then relax and read the paper. After that, take a brief walk. And then sit with your feet up for a while.    Pulmonary rehabilitation. Ask your provider, or call your local hospital to find out about pulmonary rehab programs. The programs help with managing your disease, breathing techniques, exercise, support and counseling.    Healthy eating. Eating a healthy, balanced diet and making an effort to maintain your ideal weight are important to staying as healthy as possible. Make sure you have a lot of fruit and vegetables every day, as well as balanced portions of whole grains, lean meats and fish, and low-fat dairy products.  Date Last Reviewed: 5/1/2016 2000-2017 [employer]. [address] [place] [address], [place] [address]. All rights reserved. This information is not intended as a substitute for professional medical care. Always follow your healthcare professional's instructions.            Deandre Camejo MD  [employer]

## 2018-03-30 DIAGNOSIS — L21.9 SEBORRHEIC DERMATITIS: ICD-10-CM

## 2018-03-30 RX ORDER — TRIAMCINOLONE ACETONIDE 1 MG/G
CREAM TOPICAL
Qty: 30 G | Refills: 0 | Status: SHIPPED | OUTPATIENT
Start: 2018-03-30

## 2018-03-30 NOTE — TELEPHONE ENCOUNTER
"Requested Prescriptions   Pending Prescriptions Disp Refills     triamcinolone (KENALOG) 0.1 % cream [Pharmacy Med Name: TRIAMCINOLONE ACETONIDE 0.1% CREA] 30 g 0     Sig: APPLY SPARINGLY TO EAR CANAL TWO TIMES A DAY    Topical Steroid Protocol Passed    3/30/2018  7:30 AM       Passed - Patient is age 6 or older       Passed - Authorizing prescriber's most recent note related to this medication read.       Passed - High potency steroid not ordered       Passed - Recent (12 mo) or future (30 days) visit within the authorizing provider's specialty    Patient had office visit in the last 12 months or has a visit in the next 30 days with authorizing provider or within the authorizing provider's specialty.  See \"Patient Info\" tab in inbasket, or \"Choose Columns\" in Meds & Orders section of the refill encounter.              Last Written Prescription Date:  12/18/17  Last Fill Quantity: 30 g,  # refills: 0   Last Office Visit with [facility] prescribing provider:  2/16/18   Future Office Visit:       "

## 2018-03-30 NOTE — TELEPHONE ENCOUNTER
Prescription approved per [facility] Refill Protocol.  Steph Peralta RN

## 2018-12-20 DIAGNOSIS — I10 ESSENTIAL HYPERTENSION WITH GOAL BLOOD PRESSURE LESS THAN 140/90: ICD-10-CM

## 2018-12-21 RX ORDER — LISINOPRIL 20 MG/1
TABLET ORAL
Qty: 90 TABLET | Refills: 0 | Status: SHIPPED | OUTPATIENT
Start: 2018-12-21 | End: 2019-05-05

## 2018-12-21 RX ORDER — METOPROLOL TARTRATE 50 MG
TABLET ORAL
Qty: 180 TABLET | Refills: 0 | Status: SHIPPED | OUTPATIENT
Start: 2018-12-21 | End: 2019-05-05

## 2018-12-21 NOTE — TELEPHONE ENCOUNTER
Routing refill request to provider for review/approval because:  Labs not current:  Creatinine, potassium    LAURA De Paz, RN  [facility]

## 2019-01-04 ENCOUNTER — HOSPITAL ENCOUNTER (EMERGENCY)
Facility: CLINIC | Age: 54
Discharge: HOME | End: 2019-01-04
Attending: FAMILY MEDICINE | Admitting: FAMILY MEDICINE

## 2019-01-04 ENCOUNTER — APPOINTMENT (OUTPATIENT)
Dept: ULTRASOUND IMAGING | Facility: CLINIC | Age: 54
End: 2019-01-04

## 2019-01-04 VITALS
DIASTOLIC BLOOD PRESSURE: 82 MMHG | RESPIRATION RATE: 16 BRPM | WEIGHT: 259 LBS | TEMPERATURE: 99.9 F | BODY MASS INDEX: 37.16 KG/M2 | SYSTOLIC BLOOD PRESSURE: 142 MMHG | OXYGEN SATURATION: 99 % | HEART RATE: 72 BPM

## 2019-01-04 DIAGNOSIS — M79.89 RIGHT LEG SWELLING: ICD-10-CM

## 2019-01-04 PROCEDURE — 99283 EMERGENCY DEPT VISIT LOW MDM: CPT | Mod: Z6 | Performed by: FAMILY MEDICINE

## 2019-01-04 PROCEDURE — 99284 EMERGENCY DEPT VISIT MOD MDM: CPT | Mod: 25 | Performed by: FAMILY MEDICINE

## 2019-01-04 PROCEDURE — 93971 EXTREMITY STUDY: CPT | Mod: LT

## 2019-01-04 ASSESSMENT — ENCOUNTER SYMPTOMS
CHILLS: 0
SHORTNESS OF BREATH: 0
FEVER: 0
CHEST TIGHTNESS: 0

## 2019-01-04 NOTE — ED AVS SNAPSHOT
[facility] Emergency Department  [address] [place] [address]  Phone:  [phone]  Fax:  [phone]                                    Minnie Chisholm   MRN: [id number]    Department:  [facility] Emergency Department   Date of Visit:  1/4/2019           After Visit Summary Signature Page    I have received my discharge instructions, and my questions have been answered. I have discussed any challenges I see with this plan with the nurse or doctor.    ..........................................................................................................................................  Patient/Patient Representative Signature      ..........................................................................................................................................  Patient Representative Print Name and Relationship to Patient    ..................................................               ................................................  Date                                   Time    ..........................................................................................................................................  Reviewed by Signature/Title    ...................................................              ..............................................  Date                                               Time          22EPIC Rev 08/18

## 2019-01-05 NOTE — ED PROVIDER NOTES
History     Chief Complaint   Patient presents with     Leg Swelling     HPI  Minnie Chisholm is a 53 year old female who Presents to the emergency room with left leg swelling and pain. One week ago she started noticing a pain in her left calf area. It has slowly moved up and now  pain behind the knee. Left leg swelling continues.She denies any knee injury or knee pain. She has no history of arthritis in this knee. Her mother had a blood clot. The patient herself has never had a blood clot and knows no family history of clotting disorder.        Problem List:    Patient Active Problem List    Diagnosis Date Noted     Chronic obstructive pulmonary disease (H) 01/31/2018     Priority: Medium     PFT 1/24/18.        Morbid obesity (H) 12/18/2017     Priority: Medium     Hypertension goal BP (blood pressure) < 140/90 02/15/2011     Priority: Medium     Anxiety state      Priority: Medium     Problem list name updated by automated process. Provider to review       HYPERLIPIDEMIA LDL GOAL <160 10/31/2010     Priority: Medium     Disturbance of skin sensation      Priority: Medium     Esophageal reflux      Priority: Medium     Mammary duct ectasia      Priority: Medium        Past Medical History:    Past Medical History:   Diagnosis Date     Chest pain, unspecified 06/30/2005     Disturbance of skin sensation      Esophageal reflux      Female stress incontinence      Hyperlipidemia LDL goal <160 10/31/2010     Hypertension goal BP (blood pressure) < 140/90 2/15/2011     Irregular menstrual cycle      Mammary duct ectasia      Tobacco use disorder        Past Surgical History:    Past Surgical History:   Procedure Laterality Date     ANTROSTOMY NASAL  5/22/2012    Procedure:ANTROSTOMY NASAL; Endoscopic Right maxillary sinus surgery exploration for right antrostomy and right submucousal resection of the turbinates; Surgeon:ALEXA JHAVERI; Location:[facility] OR     BREAST LUMPECTOMY, RT/LT  1/30/08    right breast lump  removed     C LIGATE FALLOPIAN TUBE,POSTPARTUM      Tubal Ligation     C TOTAL ABD HYSTERECTOMY+BLAD REPR  2008     C TREAT ECTOPIC PREG,NON REMVAL      Tube & Ectopic Preg., Removal     CHOLECYSTECTOMY, LAPOROSCOPIC  10/20/06    Cholecystectomy, Laparoscopic     COLONOSCOPY  2010    COMBINED COLONOSCOPY, SINGLE BIOPSY/POLYPECTOMY BY BIOPSY performed by CATIE VICENTE at  GI     ENDOSCOPIC SINUS SURGERY  2012    Procedure:ENDOSCOPIC SINUS SURGERY; Surgeon:ALEXA JHAVERI; Location: OR     ENDOSCOPY  2006    Upper GI     HC EXCISION BREAST LESION W XRAY MARKER, OPEN SINGLE  08    right     HC PLACE NEEDLE WIRE PREOP, BREAST, INITIAL  08     HEMILAMINECTOMY, DISCECTOMY LUMBAR ONE LEVEL, COMBINED Left 2015    Procedure: COMBINED HEMILAMINECTOMY, DISCECTOMY LUMBAR ONE LEVEL;  Surgeon: Emilio Patten MD;  Location: [facility] OR     HYSTERECTOMY, PAP NO LONGER INDICATED       SURGICAL HISTORY OF -       mammary gland removal, left     TURBINOPLASTY  2012    Procedure:TURBINOPLASTY; Surgeon:ALEXA JHAVERI; Location: OR       Family History:    Family History   Problem Relation Age of Onset     Cerebrovascular Disease Mother         cereb.hemm.     Anesthesia Reaction Mother         Respiratory arrest with anesthesia     Diabetes Maternal Grandfather      Heart Disease Maternal Grandfather         stephanie. Heart Failure     Alzheimer Disease Maternal Grandmother      Hypertension Brother      Lipids Brother      Hypertension Brother      Lipids Brother      Congenital Anomalies Brother      Neurologic Disorder Father         ALS     Cancer - colorectal Maternal Uncle         possible colitis       Social History:  Marital Status:   [5]  Social History     Tobacco Use     Smoking status: Former Smoker     Packs/day: 0.00     Years: 15.00     Pack years: 0.00     Types: Cigarettes     Last attempt to quit: 2011     Years since quittin.3     Smokeless  tobacco: Never Used     Tobacco comment: Is now just chewing nicotine gum, 10 pc. per day   Substance Use Topics     Alcohol use: Yes     Alcohol/week: 3.0 oz     Comment: rare     Drug use: No        Medications:      albuterol (PROAIR HFA) 108 (90 BASE) MCG/ACT Inhaler   ASPIRIN PO   lisinopril (PRINIVIL/ZESTRIL) 20 MG tablet   metoprolol tartrate (LOPRESSOR) 50 MG tablet   NICOTINE POLACRILEX 4 MG MT GUM   omeprazole (PRILOSEC) 20 MG CR capsule   order for DME   triamcinolone (KENALOG) 0.1 % cream         Review of Systems   Constitutional: Negative for chills and fever.   Respiratory: Negative for chest tightness and shortness of breath.    Cardiovascular: Negative for chest pain.   All other systems reviewed and are negative.      Physical Exam   BP: (!) 190/94  Pulse: 78  Temp: 96.3  F (35.7  C)  Resp: 20  Weight: 117.5 kg (259 lb)  SpO2: 95 %      Physical Exam   Constitutional: She is oriented to person, place, and time. She appears well-developed and well-nourished.   Alert pleasant smiling female who appears in no distress or any significant discomfort. BP (!) 190/94   Pulse 78   Temp 99.9  F (37.7  C) (Temporal)   Resp 20   Wt 117.5 kg (259 lb)   LMP 01/23/2008   SpO2 95%   BMI 37.16 kg/m       HENT:   Head: Normocephalic and atraumatic.   Eyes: Conjunctivae are normal.   Neck: Normal range of motion.   Cardiovascular: Normal rate.   Pulmonary/Chest: Effort normal.   Abdominal: There is no tenderness.   Musculoskeletal:   Left leg exam shows swelling as compared to the right leg. Swelling is from the left knee down. There is some mild calf tenderness , Negative Homans sign.   Neurological: She is alert and oriented to person, place, and time.   Skin: Skin is warm and dry. Capillary refill takes less than 2 seconds.   No redness or rash   Psychiatric: She has a normal mood and affect.   Nursing note and vitals reviewed.      ED Course     Results for orders placed or performed during the hospital  encounter of 01/04/19   US Lower Extremity Venous Duplex Left    Narrative    VENOUS DOPPLER LEFT LOWER EXTREMITY 1/4/2019 9:47 PM    HISTORY: Left calf pain and swelling.    COMPARISON: None.    FINDINGS: Color-flow imaging and Doppler waveform spectral analysis  were utilized. There is normal compressibility and spontaneous flow  throughout the left common femoral, superficial femoral, popliteal and  visualized calf veins.      Impression    IMPRESSION: No evidence for deep venous thrombosis.          Procedures               Critical Care time:  none               Results for orders placed or performed during the hospital encounter of 01/04/19 (from the past 24 hour(s))   US Lower Extremity Venous Duplex Left    Narrative    VENOUS DOPPLER LEFT LOWER EXTREMITY 1/4/2019 9:47 PM    HISTORY: Left calf pain and swelling.    COMPARISON: None.    FINDINGS: Color-flow imaging and Doppler waveform spectral analysis  were utilized. There is normal compressibility and spontaneous flow  throughout the left common femoral, superficial femoral, popliteal and  visualized calf veins.      Impression    IMPRESSION: No evidence for deep venous thrombosis.       Medications - No data to display    Assessments & Plan (with Medical Decision Making)   MDM--53-year-old presents to the emergency room with a one-week history of left calf pain and swelling.  There is some mild generalized swelling of the left leg There is no rash. Arterial and venous circulation appears to be adequate.. Ultrasound shows no DVT. Etiology of the swelling most likely arthritis in the left knee.Patient reassured there is no clot.. Recommend ibuprofen and follow-up with her primary care physician if this persists. She has no right leg swelling and no generalized edema.  I have reviewed the nursing notes.    I have reviewed the findings, diagnosis, plan and need for follow up with the patient.             Medication List      There are no discharge medications  for this visit.         Final diagnoses:   Right leg swelling       1/4/2019   [facility] EMERGENCY DEPARTMENT     Nelson, Ralph TERRELL MD  01/04/19 [id number]       Nelson, Ralph TERRELL MD  01/05/19 0003

## 2019-06-20 ENCOUNTER — MYC MEDICAL ADVICE (OUTPATIENT)
Dept: FAMILY MEDICINE | Facility: OTHER | Age: 54
End: 2019-06-20

## 2019-07-08 ENCOUNTER — MYC MEDICAL ADVICE (OUTPATIENT)
Dept: FAMILY MEDICINE | Facility: OTHER | Age: 54
End: 2019-07-08

## 2019-07-08 DIAGNOSIS — I10 ESSENTIAL HYPERTENSION WITH GOAL BLOOD PRESSURE LESS THAN 140/90: ICD-10-CM

## 2019-07-08 RX ORDER — METOPROLOL TARTRATE 50 MG
50 TABLET ORAL 2 TIMES DAILY
Qty: 180 TABLET | Refills: 0 | Status: SHIPPED | OUTPATIENT
Start: 2019-07-08

## 2019-07-08 RX ORDER — LISINOPRIL 20 MG/1
20 TABLET ORAL DAILY
Qty: 90 TABLET | Refills: 0 | Status: SHIPPED | OUTPATIENT
Start: 2019-07-08

## 2019-09-28 ENCOUNTER — HEALTH MAINTENANCE LETTER (OUTPATIENT)
Age: 54
End: 2019-09-28

## 2020-03-15 ENCOUNTER — HEALTH MAINTENANCE LETTER (OUTPATIENT)
Age: 55
End: 2020-03-15

## 2021-01-09 ENCOUNTER — HEALTH MAINTENANCE LETTER (OUTPATIENT)
Age: 56
End: 2021-01-09

## 2021-10-23 ENCOUNTER — HEALTH MAINTENANCE LETTER (OUTPATIENT)
Age: 56
End: 2021-10-23

## 2022-02-12 ENCOUNTER — HEALTH MAINTENANCE LETTER (OUTPATIENT)
Age: 57
End: 2022-02-12

## 2022-04-09 ENCOUNTER — HEALTH MAINTENANCE LETTER (OUTPATIENT)
Age: 57
End: 2022-04-09

## 2022-10-09 ENCOUNTER — HEALTH MAINTENANCE LETTER (OUTPATIENT)
Age: 57
End: 2022-10-09

## 2023-03-25 ENCOUNTER — HEALTH MAINTENANCE LETTER (OUTPATIENT)
Age: 58
End: 2023-03-25